# Patient Record
Sex: MALE | Race: WHITE | Employment: UNEMPLOYED | URBAN - METROPOLITAN AREA
[De-identification: names, ages, dates, MRNs, and addresses within clinical notes are randomized per-mention and may not be internally consistent; named-entity substitution may affect disease eponyms.]

---

## 2021-03-22 ENCOUNTER — HOSPITAL ENCOUNTER (EMERGENCY)
Age: 26
Discharge: HOME | End: 2021-03-22
Attending: EMERGENCY MEDICINE
Payer: COMMERCIAL

## 2021-03-22 ENCOUNTER — APPOINTMENT (OUTPATIENT)
Dept: GENERAL RADIOLOGY | Age: 26
End: 2021-03-22
Attending: EMERGENCY MEDICINE
Payer: COMMERCIAL

## 2021-03-22 VITALS
RESPIRATION RATE: 16 BRPM | DIASTOLIC BLOOD PRESSURE: 96 MMHG | SYSTOLIC BLOOD PRESSURE: 142 MMHG | HEART RATE: 91 BPM | OXYGEN SATURATION: 100 % | TEMPERATURE: 98 F | WEIGHT: 160 LBS | HEIGHT: 71 IN | BODY MASS INDEX: 22.4 KG/M2

## 2021-03-22 DIAGNOSIS — S67.10XA CRUSH INJURY TO FINGER, INITIAL ENCOUNTER: Primary | ICD-10-CM

## 2021-03-22 DIAGNOSIS — S61.211A LACERATION OF LEFT INDEX FINGER WITHOUT FOREIGN BODY WITHOUT DAMAGE TO NAIL, INITIAL ENCOUNTER: ICD-10-CM

## 2021-03-22 PROCEDURE — 99282 EMERGENCY DEPT VISIT SF MDM: CPT

## 2021-03-22 PROCEDURE — 74011250636 HC RX REV CODE- 250/636: Performed by: PHYSICIAN ASSISTANT

## 2021-03-22 PROCEDURE — 90715 TDAP VACCINE 7 YRS/> IM: CPT | Performed by: PHYSICIAN ASSISTANT

## 2021-03-22 PROCEDURE — 75810000293 HC SIMP/SUPERF WND  RPR

## 2021-03-22 PROCEDURE — 90471 IMMUNIZATION ADMIN: CPT

## 2021-03-22 PROCEDURE — 73130 X-RAY EXAM OF HAND: CPT

## 2021-03-22 RX ORDER — CEPHALEXIN 500 MG/1
500 CAPSULE ORAL 4 TIMES DAILY
Qty: 28 CAP | Refills: 0 | Status: SHIPPED | OUTPATIENT
Start: 2021-03-22 | End: 2021-03-29

## 2021-03-22 RX ADMIN — TETANUS TOXOID, REDUCED DIPHTHERIA TOXOID AND ACELLULAR PERTUSSIS VACCINE, ADSORBED 0.5 ML: 5; 2.5; 8; 8; 2.5 SUSPENSION INTRAMUSCULAR at 16:51

## 2021-03-22 NOTE — ED PROVIDER NOTES
Patient is a 82-year-old male presents with complaint of laceration and injury of the left index finger. Patient works at a [employer] and his finger got stuck in a paper compressing device. He reports throbbing pain to the distal phalanx of the left index finger. Patient does not recall last tetanus shot. Denies any numbness to the finger. The history is provided by the patient. Finger Pain   Pertinent negatives include no numbness and no back pain. Laceration   Pertinent negatives include no numbness and no weakness. No past medical history on file. No past surgical history on file. No family history on file.     Social History     Socioeconomic History    Marital status: SINGLE     Spouse name: Not on file    Number of children: Not on file    Years of education: Not on file    Highest education level: Not on file   Occupational History    Not on file   Social Needs    Financial resource strain: Not on file    Food insecurity     Worry: Not on file     Inability: Not on file    Transportation needs     Medical: Not on file     Non-medical: Not on file   Tobacco Use    Smoking status: Not on file   Substance and Sexual Activity    Alcohol use: Not on file    Drug use: Not on file    Sexual activity: Not on file   Lifestyle    Physical activity     Days per week: Not on file     Minutes per session: Not on file    Stress: Not on file   Relationships    Social connections     Talks on phone: Not on file     Gets together: Not on file     Attends Muslim service: Not on file     Active member of club or organization: Not on file     Attends meetings of clubs or organizations: Not on file     Relationship status: Not on file    Intimate partner violence     Fear of current or ex partner: Not on file     Emotionally abused: Not on file     Physically abused: Not on file     Forced sexual activity: Not on file   Other Topics Concern    Not on file   Social History Narrative  Not on file         ALLERGIES: Pcn [penicillins]    Review of Systems   Constitutional: Negative for chills and fever. HENT: Negative for sore throat. Respiratory: Negative for cough and shortness of breath. Cardiovascular: Negative for chest pain. Gastrointestinal: Negative for abdominal pain, nausea and vomiting. Musculoskeletal: Positive for arthralgias. Negative for back pain. Left index Finger pain   Skin: Positive for wound (laceration to the left index finger). Neurological: Negative for dizziness, weakness and numbness. Psychiatric/Behavioral: Negative for agitation and confusion. Vitals:    03/22/21 1501   BP: (!) 142/96   Pulse: 91   Resp: 16   Temp: 98 °F (36.7 °C)   SpO2: 100%   Weight: 72.6 kg (160 lb)   Height: 5' 11\" (1.803 m)            Physical Exam  Vitals signs and nursing note reviewed. Constitutional:       General: He is not in acute distress. Appearance: He is not ill-appearing or toxic-appearing. HENT:      Head: Normocephalic and atraumatic. Cardiovascular:      Rate and Rhythm: Normal rate. Pulses: Normal pulses. Musculoskeletal:      Comments: ttp of the left index finger, rom intact  1.5 cm Jagged edged laceration to the palmar aspect of the distal phalanx of the left index finger   Neurological:      Mental Status: He is alert and oriented to person, place, and time. Psychiatric:         Mood and Affect: Mood normal.         Behavior: Behavior normal.         Thought Content: Thought content normal.         Judgment: Judgment normal.          MDM  Number of Diagnoses or Management Options  Crush injury to finger, initial encounter  Laceration of left index finger without foreign body without damage to nail, initial encounter  Diagnosis management comments: Tdap updated. Xray negative for any acute fracture or disclocation. Wound cleaned and repaired as described in procedure note.    Pt placed in finger splint for comfort purposes. Will d/c with keflex, advised anti-inflammatories for pain/swelling. Discussed wound care, what to watch for in regards to infection as well as suture removal.   Will dc home, pt verbalizes understanding and is agreeable to plan. Wound Repair    Date/Time: 3/22/2021 4:45 PM  Performed by: PAPreparation: skin prepped with Betadine  Pre-procedure re-eval: Immediately prior to the procedure, the patient was reevaluated and found suitable for the planned procedure and any planned medications. Location details: left index finger  Wound length:2.5 cm or less  Anesthesia: digital block    Anesthesia:  Local Anesthetic: lidocaine 1% without epinephrine  Anesthetic total: 5 mL  Foreign bodies: no foreign bodies  Irrigation solution: saline  Irrigation method: jet lavage  Debridement: none  Skin closure: 5-0 nylon  Number of sutures: 4  Technique: simple  Approximation: close  Dressing: tube gauze and splint  Patient tolerance: patient tolerated the procedure well with no immediate complications  My total time at bedside, performing this procedure was 16-30 minutes.

## 2021-03-22 NOTE — ED NOTES
I have reviewed discharge instructions with the patient.  The patient verbalized understanding.    Patient left ED via Discharge Method: ambulatory to Home with self  Opportunity for questions and clarification provided.       Patient given 1 scripts.         To continue your aftercare when you leave the hospital, you may receive an automated call from our care team to check in on how you are doing.  This is a free service and part of our promise to provide the best care and service to meet your aftercare needs.” If you have questions, or wish to unsubscribe from this service please call [phone].  Thank you for Choosing our [facility] Emergency Department.

## 2021-03-22 NOTE — ED TRIAGE NOTES
Patient ambulatory to triage with mask in place. Patient reports he crushed his pointer finger in a rolling machine. Laceration not to tip of finger. No nailbed involvement.

## 2021-04-16 ENCOUNTER — HOSPITAL ENCOUNTER (EMERGENCY)
Age: 26
Discharge: HOME | End: 2021-04-16
Attending: EMERGENCY MEDICINE
Payer: COMMERCIAL

## 2021-04-16 VITALS
HEART RATE: 94 BPM | TEMPERATURE: 98.5 F | WEIGHT: 170 LBS | SYSTOLIC BLOOD PRESSURE: 142 MMHG | BODY MASS INDEX: 23.8 KG/M2 | DIASTOLIC BLOOD PRESSURE: 94 MMHG | OXYGEN SATURATION: 99 % | RESPIRATION RATE: 17 BRPM | HEIGHT: 71 IN

## 2021-04-16 DIAGNOSIS — T50.905A MEDICATION SIDE EFFECT, INITIAL ENCOUNTER: Primary | ICD-10-CM

## 2021-04-16 PROCEDURE — 99282 EMERGENCY DEPT VISIT SF MDM: CPT

## 2021-04-16 RX ORDER — MIRTAZAPINE 15 MG/1
TABLET, FILM COATED ORAL
COMMUNITY

## 2021-04-16 RX ORDER — TRAZODONE HYDROCHLORIDE 100 MG/1
100 TABLET ORAL
COMMUNITY

## 2021-04-16 RX ORDER — HYDROXYZINE PAMOATE 25 MG/1
25 CAPSULE ORAL
COMMUNITY

## 2021-04-16 RX ORDER — PRAZOSIN HYDROCHLORIDE 1 MG/1
CAPSULE ORAL
COMMUNITY

## 2021-04-16 RX ORDER — ESCITALOPRAM OXALATE 10 MG/1
10 TABLET ORAL DAILY
COMMUNITY

## 2021-04-16 NOTE — DISCHARGE INSTRUCTIONS
Stop the prazosin, continue with the other medications. If you choose to come off Lexapro, take 1/2 tab for 7 days then stop. Use the hydroxizine only as needed as directed. Drink plenty of fluids. Rest, follow up with PCP for recheck and return to the ED if worse.

## 2021-04-16 NOTE — ED TRIAGE NOTES
Pt arrives pov c/o confusion, states 28 days clean from fentanyl and currently taking lexapro. Pt a/ox4. States this morning didn't know where he was and doing things that he didn't know he was doing, reports these episodes started 5 days ago. \"I don't remember getting out of bed and my mom tells me I'm slamming drawers in the kitchen\". No SI or HI thoughts. Denies any discomfort.

## 2021-04-16 NOTE — ED NOTES
I have reviewed discharge instructions with the patient. The patient verbalized understanding. Patient left ED via Discharge Method: ambulatory to Home with self. Opportunity for questions and clarification provided. Patient given 0 scripts. To continue your aftercare when you leave the hospital, you may receive an automated call from our care team to check in on how you are doing. This is a free service and part of our promise to provide the best care and service to meet your aftercare needs.  If you have questions, or wish to unsubscribe from this service please call [phone]. Thank you for Choosing our [facility] Emergency Department.

## 2021-04-16 NOTE — ED NOTES
[facility] called as pt was transferred there earlier today. Provider with questions about plan of care, studies completed et. I reviewed with them the plan of care as per provider notes here.

## 2021-04-16 NOTE — ED PROVIDER NOTES
Patient is here with sleepwalking over the last couple of days. He states he was in rehab and they placed him on hydroxyzine, prazosin for nightmares, and Lexapro. He states that he does take trazodone and Remeron and has for years. He states he had an episode where he felt dizzy the other day like his blood pressure was low and states he normally has low blood pressure so he is not sure why he was placed on a blood pressure medication. Patient does not have a primary care physician. He is going to go into a clean/sober living facility and cannot be on any medication while he is there so he is wanting to come off of all of the medication. He is not having any chest pain, shortness of breath, headache, visual changes, dizziness, abdominal pain, trouble with urination or bowel movements, fever or other new symptoms. He did ambulate to the room without difficulty and is well-hydrated. The history is provided by the patient. No past medical history on file. No past surgical history on file. No family history on file.     Social History     Socioeconomic History    Marital status: SINGLE     Spouse name: Not on file    Number of children: Not on file    Years of education: Not on file    Highest education level: Not on file   Occupational History    Not on file   Social Needs    Financial resource strain: Not on file    Food insecurity     Worry: Not on file     Inability: Not on file    Transportation needs     Medical: Not on file     Non-medical: Not on file   Tobacco Use    Smoking status: Not on file   Substance and Sexual Activity    Alcohol use: Not on file    Drug use: Not on file    Sexual activity: Not on file   Lifestyle    Physical activity     Days per week: Not on file     Minutes per session: Not on file    Stress: Not on file   Relationships    Social connections     Talks on phone: Not on file     Gets together: Not on file     Attends Judaism service: Not on file Active member of club or organization: Not on file     Attends meetings of clubs or organizations: Not on file     Relationship status: Not on file    Intimate partner violence     Fear of current or ex partner: Not on file     Emotionally abused: Not on file     Physically abused: Not on file     Forced sexual activity: Not on file   Other Topics Concern    Not on file   Social History Narrative    Not on file         ALLERGIES: Pcn [penicillins]    Review of Systems   Constitutional: Negative. HENT: Negative. Eyes: Negative. Respiratory: Negative. Cardiovascular: Negative. Gastrointestinal: Negative. Genitourinary: Negative. Musculoskeletal: Negative. Skin: Negative. Neurological: Negative. Psychiatric/Behavioral: Negative. All other systems reviewed and are negative. Vitals:    04/16/21 0937   BP: (!) 142/94   Pulse: 94   Resp: 17   Temp: 98.5 °F (36.9 °C)   SpO2: 99%   Weight: 77.1 kg (170 lb)   Height: 5' 11\" (1.803 m)            Physical Exam  Vitals signs and nursing note reviewed. Constitutional:       Appearance: He is well-developed. HENT:      Head: Normocephalic and atraumatic. Right Ear: Tympanic membrane, ear canal and external ear normal.      Left Ear: Tympanic membrane, ear canal and external ear normal.      Nose: Nose normal.      Mouth/Throat:      Mouth: Mucous membranes are moist.   Eyes:      Conjunctiva/sclera: Conjunctivae normal.      Pupils: Pupils are equal, round, and reactive to light. Neck:      Musculoskeletal: Normal range of motion and neck supple. Cardiovascular:      Rate and Rhythm: Normal rate and regular rhythm. Pulses: Normal pulses. Heart sounds: Normal heart sounds. Pulmonary:      Effort: Pulmonary effort is normal.      Breath sounds: Normal breath sounds. Abdominal:      General: Abdomen is flat. Bowel sounds are normal.      Palpations: Abdomen is soft. Musculoskeletal: Normal range of motion. Skin:     General: Skin is warm and dry. Capillary Refill: Capillary refill takes less than 2 seconds. Neurological:      General: No focal deficit present. Mental Status: He is alert and oriented to person, place, and time. Mental status is at baseline. GCS: GCS eye subscore is 4. GCS verbal subscore is 5. GCS motor subscore is 6. Cranial Nerves: Cranial nerves are intact. No cranial nerve deficit. Sensory: Sensation is intact. No sensory deficit. Motor: Motor function is intact. No weakness. Coordination: Coordination is intact. Coordination normal.      Gait: Gait is intact. Gait normal.      Deep Tendon Reflexes: Reflexes are normal and symmetric. Reflexes normal.      Reflex Scores:       Tricep reflexes are 2+ on the right side and 2+ on the left side. Bicep reflexes are 2+ on the right side and 2+ on the left side. Brachioradialis reflexes are 2+ on the right side and 2+ on the left side. Patellar reflexes are 2+ on the right side and 2+ on the left side. Achilles reflexes are 2+ on the right side and 2+ on the left side. Psychiatric:         Mood and Affect: Mood normal.         Behavior: Behavior normal.         Thought Content: Thought content normal.         Judgment: Judgment normal.          MDM  Number of Diagnoses or Management Options  Risk of Complications, Morbidity, and/or Mortality  Presenting problems: moderate  Diagnostic procedures: moderate  Management options: moderate    Patient Progress  Patient progress: stable         Procedures      10:05 AM Spoke with pharmacy about stopping the prazosin. He do not need to titrate coming off of it, only when going up. Patient symptoms more than likely are from taking that. He has had an episode of orthostatic hypotension and some sleepwalking. He wants to come off all of his medication.   I have advised him to stop the prazosin and if he chooses to stop the Lexapro to take a half a tablet for 7 days and then discontinue. He should do one at a time so he knows what his symptoms were coming from. He was encouraged to drink plenty of water, rest and return to the ED if worsening in any way. He is stable for discharge and ambulatory out of the ER without difficulty at this time. The patient was observed in the ED. Results Reviewed:    I discussed the results of all labs, procedures, radiographs, and treatments with the patient and available family. Treatment plan is agreed upon and the patient is ready for discharge. All voiced understanding of the discharge plan and medication instructions or changes as appropriate. Questions about treatment in the ED were answered. All were encouraged to return should symptoms worsen or new problems develop.

## 2024-02-10 ENCOUNTER — APPOINTMENT (OUTPATIENT)
Dept: GENERAL RADIOLOGY | Age: 29
DRG: 091 | End: 2024-02-10

## 2024-02-10 ENCOUNTER — HOSPITAL ENCOUNTER (INPATIENT)
Age: 29
LOS: 5 days | Discharge: OTHER INSTITUTION | DRG: 091 | End: 2024-02-15
Attending: EMERGENCY MEDICINE | Admitting: FAMILY MEDICINE

## 2024-02-10 ENCOUNTER — APPOINTMENT (OUTPATIENT)
Dept: CT IMAGING | Age: 29
DRG: 091 | End: 2024-02-10

## 2024-02-10 DIAGNOSIS — T14.8XXA SKIN ABRASION: ICD-10-CM

## 2024-02-10 DIAGNOSIS — R41.82 ALTERED MENTAL STATUS, UNSPECIFIED ALTERED MENTAL STATUS TYPE: Primary | ICD-10-CM

## 2024-02-10 DIAGNOSIS — M62.82 NON-TRAUMATIC RHABDOMYOLYSIS: ICD-10-CM

## 2024-02-10 DIAGNOSIS — F19.10 DRUG ABUSE (HCC): ICD-10-CM

## 2024-02-10 DIAGNOSIS — T07.XXXA ABRASIONS OF MULTIPLE SITES: ICD-10-CM

## 2024-02-10 PROBLEM — R40.2430 GLASGOW COMA SCALE TOTAL SCORE 3-8 (HCC): Status: ACTIVE | Noted: 2024-02-10

## 2024-02-10 PROBLEM — T79.6XXA TRAUMATIC RHABDOMYOLYSIS (HCC): Status: ACTIVE | Noted: 2024-02-10

## 2024-02-10 LAB
ALBUMIN SERPL-MCNC: 3.6 G/DL (ref 3.5–5)
ALBUMIN/GLOB SERPL: 1.3 (ref 0.4–1.6)
ALP SERPL-CCNC: 72 U/L (ref 50–136)
ALT SERPL-CCNC: 41 U/L (ref 12–65)
AMPHET UR QL SCN: POSITIVE
ANION GAP SERPL CALC-SCNC: 9 MMOL/L (ref 2–11)
APPEARANCE UR: ABNORMAL
ARTERIAL PATENCY WRIST A: POSITIVE
AST SERPL-CCNC: 273 U/L (ref 15–37)
BACTERIA URNS QL MICRO: ABNORMAL /HPF
BARBITURATES UR QL SCN: NEGATIVE
BASE EXCESS BLD CALC-SCNC: 0.3 MMOL/L
BASOPHILS # BLD: 0 K/UL (ref 0–0.2)
BASOPHILS NFR BLD: 0 % (ref 0–2)
BDY SITE: ABNORMAL
BENZODIAZ UR QL: NEGATIVE
BILIRUB SERPL-MCNC: 0.9 MG/DL (ref 0.2–1.1)
BILIRUB UR QL: NEGATIVE
BUN SERPL-MCNC: 12 MG/DL (ref 6–23)
CALCIUM SERPL-MCNC: 8.6 MG/DL (ref 8.3–10.4)
CANNABINOIDS UR QL SCN: POSITIVE
CASTS URNS QL MICRO: ABNORMAL /LPF
CHLORIDE SERPL-SCNC: 102 MMOL/L (ref 103–113)
CK SERPL-CCNC: ABNORMAL U/L (ref 21–215)
CO2 SERPL-SCNC: 28 MMOL/L (ref 21–32)
COCAINE UR QL SCN: POSITIVE
COLOR UR: ABNORMAL
CREAT SERPL-MCNC: 1.5 MG/DL (ref 0.8–1.5)
CRYSTALS URNS QL MICRO: ABNORMAL /LPF
DIFFERENTIAL METHOD BLD: ABNORMAL
EOSINOPHIL # BLD: 0 K/UL (ref 0–0.8)
EOSINOPHIL NFR BLD: 0 % (ref 0.5–7.8)
ERYTHROCYTE [DISTWIDTH] IN BLOOD BY AUTOMATED COUNT: 12.9 % (ref 11.9–14.6)
ETHANOL SERPL-MCNC: <3 MG/DL (ref 0–0.08)
GAS FLOW.O2 O2 DELIVERY SYS: ABNORMAL
GLOBULIN SER CALC-MCNC: 2.8 G/DL (ref 2.8–4.5)
GLUCOSE BLD STRIP.AUTO-MCNC: 92 MG/DL (ref 65–100)
GLUCOSE SERPL-MCNC: 54 MG/DL (ref 65–100)
GLUCOSE UR STRIP.AUTO-MCNC: NEGATIVE MG/DL
HCO3 BLD-SCNC: 24.7 MMOL/L (ref 22–26)
HCT VFR BLD AUTO: 35.5 % (ref 41.1–50.3)
HGB BLD-MCNC: 12.1 G/DL (ref 13.6–17.2)
HGB UR QL STRIP: ABNORMAL
HIV 1+2 AB+HIV1 P24 AG SERPL QL IA: NONREACTIVE
HIV 1/2 RESULT COMMENT: NORMAL
IMM GRANULOCYTES # BLD AUTO: 0.1 K/UL (ref 0–0.5)
IMM GRANULOCYTES NFR BLD AUTO: 1 % (ref 0–5)
IPAP/PIP/HIGH PEEP: 20
KETONES UR QL STRIP.AUTO: 15 MG/DL
LACTATE SERPL-SCNC: 0.5 MMOL/L (ref 0.4–2)
LEUKOCYTE ESTERASE UR QL STRIP.AUTO: NEGATIVE
LYMPHOCYTES # BLD: 0.9 K/UL (ref 0.5–4.6)
LYMPHOCYTES NFR BLD: 6 % (ref 13–44)
MCH RBC QN AUTO: 29.8 PG (ref 26.1–32.9)
MCHC RBC AUTO-ENTMCNC: 34.1 G/DL (ref 31.4–35)
MCV RBC AUTO: 87.4 FL (ref 82–102)
METHADONE UR QL: NEGATIVE
MONOCYTES # BLD: 1.2 K/UL (ref 0.1–1.3)
MONOCYTES NFR BLD: 8 % (ref 4–12)
NEUTS SEG # BLD: 12.7 K/UL (ref 1.7–8.2)
NEUTS SEG NFR BLD: 85 % (ref 43–78)
NITRITE UR QL STRIP.AUTO: NEGATIVE
NRBC # BLD: 0 K/UL (ref 0–0.2)
O2/TOTAL GAS SETTING VFR VENT: 50 %
OPIATES UR QL: NEGATIVE
OTHER OBSERVATIONS: ABNORMAL
PCO2 BLD: 38.3 MMHG (ref 35–45)
PCP UR QL: NEGATIVE
PH BLD: 7.42 (ref 7.35–7.45)
PH UR STRIP: 6.5 (ref 5–9)
PLATELET # BLD AUTO: 265 K/UL (ref 150–450)
PMV BLD AUTO: 10.4 FL (ref 9.4–12.3)
PO2 BLD: 235 MMHG (ref 75–100)
POTASSIUM SERPL-SCNC: 3.9 MMOL/L (ref 3.5–5.1)
PRESSURE SUPPORT SETTING VENT: 8 CMH2O
PROT SERPL-MCNC: 6.4 G/DL (ref 6.3–8.2)
PROT UR STRIP-MCNC: 100 MG/DL
RBC # BLD AUTO: 4.06 M/UL (ref 4.23–5.6)
RBC #/AREA URNS HPF: ABNORMAL /HPF
SAO2 % BLD: 99.8 % (ref 95–98)
SERVICE CMNT-IMP: ABNORMAL
SERVICE CMNT-IMP: NORMAL
SODIUM SERPL-SCNC: 139 MMOL/L (ref 136–146)
SP GR UR REFRACTOMETRY: 1.02 (ref 1–1.02)
SPECIMEN TYPE: ABNORMAL
UROBILINOGEN UR QL STRIP.AUTO: 1 EU/DL (ref 0.2–1)
VENTILATION MODE VENT: ABNORMAL
VT SETTING VENT: 500 ML
WBC # BLD AUTO: 14.9 K/UL (ref 4.3–11.1)
WBC URNS QL MICRO: ABNORMAL /HPF
YEAST URNS QL MICRO: ABNORMAL

## 2024-02-10 PROCEDURE — 2580000003 HC RX 258: Performed by: INTERNAL MEDICINE

## 2024-02-10 PROCEDURE — 87389 HIV-1 AG W/HIV-1&-2 AB AG IA: CPT

## 2024-02-10 PROCEDURE — 71260 CT THORAX DX C+: CPT

## 2024-02-10 PROCEDURE — 6360000004 HC RX CONTRAST MEDICATION: Performed by: EMERGENCY MEDICINE

## 2024-02-10 PROCEDURE — 6360000002 HC RX W HCPCS

## 2024-02-10 PROCEDURE — 6360000002 HC RX W HCPCS: Performed by: EMERGENCY MEDICINE

## 2024-02-10 PROCEDURE — 80307 DRUG TEST PRSMV CHEM ANLYZR: CPT

## 2024-02-10 PROCEDURE — 82550 ASSAY OF CK (CPK): CPT

## 2024-02-10 PROCEDURE — 2580000003 HC RX 258: Performed by: EMERGENCY MEDICINE

## 2024-02-10 PROCEDURE — 2580000003 HC RX 258

## 2024-02-10 PROCEDURE — 71045 X-RAY EXAM CHEST 1 VIEW: CPT

## 2024-02-10 PROCEDURE — 96375 TX/PRO/DX INJ NEW DRUG ADDON: CPT

## 2024-02-10 PROCEDURE — 36600 WITHDRAWAL OF ARTERIAL BLOOD: CPT

## 2024-02-10 PROCEDURE — 2500000003 HC RX 250 WO HCPCS

## 2024-02-10 PROCEDURE — 94002 VENT MGMT INPAT INIT DAY: CPT

## 2024-02-10 PROCEDURE — 2100000000 HC CCU R&B

## 2024-02-10 PROCEDURE — 70450 CT HEAD/BRAIN W/O DYE: CPT

## 2024-02-10 PROCEDURE — 72125 CT NECK SPINE W/O DYE: CPT

## 2024-02-10 PROCEDURE — A4216 STERILE WATER/SALINE, 10 ML: HCPCS | Performed by: EMERGENCY MEDICINE

## 2024-02-10 PROCEDURE — 74018 RADEX ABDOMEN 1 VIEW: CPT

## 2024-02-10 PROCEDURE — 5A1945Z RESPIRATORY VENTILATION, 24-96 CONSECUTIVE HOURS: ICD-10-PCS | Performed by: EMERGENCY MEDICINE

## 2024-02-10 PROCEDURE — 96365 THER/PROPH/DIAG IV INF INIT: CPT

## 2024-02-10 PROCEDURE — 96374 THER/PROPH/DIAG INJ IV PUSH: CPT

## 2024-02-10 PROCEDURE — 0BH17EZ INSERTION OF ENDOTRACHEAL AIRWAY INTO TRACHEA, VIA NATURAL OR ARTIFICIAL OPENING: ICD-10-PCS | Performed by: EMERGENCY MEDICINE

## 2024-02-10 PROCEDURE — 85025 COMPLETE CBC W/AUTO DIFF WBC: CPT

## 2024-02-10 PROCEDURE — 99285 EMERGENCY DEPT VISIT HI MDM: CPT

## 2024-02-10 PROCEDURE — 81001 URINALYSIS AUTO W/SCOPE: CPT

## 2024-02-10 PROCEDURE — 82077 ASSAY SPEC XCP UR&BREATH IA: CPT

## 2024-02-10 PROCEDURE — 36415 COLL VENOUS BLD VENIPUNCTURE: CPT

## 2024-02-10 PROCEDURE — 6370000000 HC RX 637 (ALT 250 FOR IP)

## 2024-02-10 PROCEDURE — 99223 1ST HOSP IP/OBS HIGH 75: CPT

## 2024-02-10 PROCEDURE — 2500000003 HC RX 250 WO HCPCS: Performed by: EMERGENCY MEDICINE

## 2024-02-10 PROCEDURE — 80053 COMPREHEN METABOLIC PANEL: CPT

## 2024-02-10 PROCEDURE — 82803 BLOOD GASES ANY COMBINATION: CPT

## 2024-02-10 PROCEDURE — 83605 ASSAY OF LACTIC ACID: CPT

## 2024-02-10 PROCEDURE — 87040 BLOOD CULTURE FOR BACTERIA: CPT

## 2024-02-10 PROCEDURE — 82962 GLUCOSE BLOOD TEST: CPT

## 2024-02-10 RX ORDER — POLYETHYLENE GLYCOL 3350 17 G/17G
17 POWDER, FOR SOLUTION ORAL DAILY PRN
Status: DISCONTINUED | OUTPATIENT
Start: 2024-02-10 | End: 2024-02-15 | Stop reason: HOSPADM

## 2024-02-10 RX ORDER — ONDANSETRON 2 MG/ML
4 INJECTION INTRAMUSCULAR; INTRAVENOUS EVERY 6 HOURS PRN
Status: DISCONTINUED | OUTPATIENT
Start: 2024-02-10 | End: 2024-02-15 | Stop reason: HOSPADM

## 2024-02-10 RX ORDER — 0.9 % SODIUM CHLORIDE 0.9 %
2000 INTRAVENOUS SOLUTION INTRAVENOUS
Status: COMPLETED | OUTPATIENT
Start: 2024-02-10 | End: 2024-02-10

## 2024-02-10 RX ORDER — MAGNESIUM SULFATE IN WATER 40 MG/ML
2000 INJECTION, SOLUTION INTRAVENOUS PRN
Status: DISCONTINUED | OUTPATIENT
Start: 2024-02-10 | End: 2024-02-15 | Stop reason: HOSPADM

## 2024-02-10 RX ORDER — DEXTROSE MONOHYDRATE 100 MG/ML
INJECTION, SOLUTION INTRAVENOUS ONCE
Status: DISCONTINUED | OUTPATIENT
Start: 2024-02-10 | End: 2024-02-13

## 2024-02-10 RX ORDER — POTASSIUM CHLORIDE 29.8 MG/ML
20 INJECTION INTRAVENOUS PRN
Status: DISCONTINUED | OUTPATIENT
Start: 2024-02-10 | End: 2024-02-15 | Stop reason: HOSPADM

## 2024-02-10 RX ORDER — SODIUM CHLORIDE 0.9 % (FLUSH) 0.9 %
5-40 SYRINGE (ML) INJECTION PRN
Status: DISCONTINUED | OUTPATIENT
Start: 2024-02-10 | End: 2024-02-15 | Stop reason: HOSPADM

## 2024-02-10 RX ORDER — SODIUM CHLORIDE 0.9 % (FLUSH) 0.9 %
5-40 SYRINGE (ML) INJECTION EVERY 12 HOURS SCHEDULED
Status: DISCONTINUED | OUTPATIENT
Start: 2024-02-10 | End: 2024-02-15 | Stop reason: HOSPADM

## 2024-02-10 RX ORDER — ETOMIDATE 2 MG/ML
INJECTION INTRAVENOUS DAILY PRN
Status: COMPLETED | OUTPATIENT
Start: 2024-02-10 | End: 2024-02-10

## 2024-02-10 RX ORDER — ACETAMINOPHEN 325 MG/1
650 TABLET ORAL EVERY 6 HOURS PRN
Status: DISCONTINUED | OUTPATIENT
Start: 2024-02-10 | End: 2024-02-15 | Stop reason: HOSPADM

## 2024-02-10 RX ORDER — SODIUM CHLORIDE 9 MG/ML
INJECTION, SOLUTION INTRAVENOUS CONTINUOUS
Status: DISCONTINUED | OUTPATIENT
Start: 2024-02-10 | End: 2024-02-12

## 2024-02-10 RX ORDER — FENTANYL CITRATE-0.9 % NACL/PF 10 MCG/ML
25-200 PLASTIC BAG, INJECTION (ML) INTRAVENOUS CONTINUOUS
Status: DISCONTINUED | OUTPATIENT
Start: 2024-02-10 | End: 2024-02-10

## 2024-02-10 RX ORDER — FENTANYL CITRATE-0.9 % NACL/PF 10 MCG/ML
25-200 PLASTIC BAG, INJECTION (ML) INTRAVENOUS CONTINUOUS
Status: DISCONTINUED | OUTPATIENT
Start: 2024-02-10 | End: 2024-02-12

## 2024-02-10 RX ORDER — 0.9 % SODIUM CHLORIDE 0.9 %
1000 INTRAVENOUS SOLUTION INTRAVENOUS ONCE
Status: COMPLETED | OUTPATIENT
Start: 2024-02-10 | End: 2024-02-10

## 2024-02-10 RX ORDER — PROPOFOL 10 MG/ML
5-50 INJECTION, EMULSION INTRAVENOUS
Status: COMPLETED | OUTPATIENT
Start: 2024-02-10 | End: 2024-02-10

## 2024-02-10 RX ORDER — ONDANSETRON 4 MG/1
4 TABLET, ORALLY DISINTEGRATING ORAL EVERY 8 HOURS PRN
Status: DISCONTINUED | OUTPATIENT
Start: 2024-02-10 | End: 2024-02-15 | Stop reason: HOSPADM

## 2024-02-10 RX ORDER — ACETAMINOPHEN 650 MG/1
650 SUPPOSITORY RECTAL EVERY 6 HOURS PRN
Status: DISCONTINUED | OUTPATIENT
Start: 2024-02-10 | End: 2024-02-15 | Stop reason: HOSPADM

## 2024-02-10 RX ORDER — POTASSIUM CHLORIDE 7.45 MG/ML
10 INJECTION INTRAVENOUS PRN
Status: DISCONTINUED | OUTPATIENT
Start: 2024-02-10 | End: 2024-02-15 | Stop reason: HOSPADM

## 2024-02-10 RX ORDER — PROPOFOL 10 MG/ML
INJECTION, EMULSION INTRAVENOUS
Status: COMPLETED
Start: 2024-02-10 | End: 2024-02-10

## 2024-02-10 RX ORDER — SUCCINYLCHOLINE CHLORIDE 20 MG/ML
INJECTION INTRAMUSCULAR; INTRAVENOUS DAILY PRN
Status: COMPLETED | OUTPATIENT
Start: 2024-02-10 | End: 2024-02-10

## 2024-02-10 RX ORDER — SODIUM CHLORIDE 9 MG/ML
INJECTION, SOLUTION INTRAVENOUS PRN
Status: DISCONTINUED | OUTPATIENT
Start: 2024-02-10 | End: 2024-02-15 | Stop reason: HOSPADM

## 2024-02-10 RX ORDER — 0.9 % SODIUM CHLORIDE 0.9 %
1000 INTRAVENOUS SOLUTION INTRAVENOUS
Status: COMPLETED | OUTPATIENT
Start: 2024-02-10 | End: 2024-02-10

## 2024-02-10 RX ORDER — ENOXAPARIN SODIUM 100 MG/ML
40 INJECTION SUBCUTANEOUS DAILY
Status: DISCONTINUED | OUTPATIENT
Start: 2024-02-10 | End: 2024-02-10

## 2024-02-10 RX ORDER — VECURONIUM BROMIDE 1 MG/ML
10 INJECTION, POWDER, LYOPHILIZED, FOR SOLUTION INTRAVENOUS
Status: COMPLETED | OUTPATIENT
Start: 2024-02-10 | End: 2024-02-10

## 2024-02-10 RX ORDER — PROPOFOL 10 MG/ML
5-50 INJECTION, EMULSION INTRAVENOUS CONTINUOUS
Status: DISCONTINUED | OUTPATIENT
Start: 2024-02-10 | End: 2024-02-12

## 2024-02-10 RX ADMIN — PROPOFOL 50 MCG/KG/MIN: 10 INJECTION, EMULSION INTRAVENOUS at 12:34

## 2024-02-10 RX ADMIN — SODIUM CHLORIDE 2000 ML: 9 INJECTION, SOLUTION INTRAVENOUS at 04:40

## 2024-02-10 RX ADMIN — SODIUM CHLORIDE 1000 ML: 9 INJECTION, SOLUTION INTRAVENOUS at 16:40

## 2024-02-10 RX ADMIN — SODIUM CHLORIDE: 9 INJECTION, SOLUTION INTRAVENOUS at 21:28

## 2024-02-10 RX ADMIN — SODIUM CHLORIDE 1000 ML: 9 INJECTION, SOLUTION INTRAVENOUS at 02:59

## 2024-02-10 RX ADMIN — PROPOFOL 25 MCG/KG/MIN: 10 INJECTION, EMULSION INTRAVENOUS at 04:27

## 2024-02-10 RX ADMIN — SODIUM CHLORIDE: 9 INJECTION, SOLUTION INTRAVENOUS at 09:39

## 2024-02-10 RX ADMIN — Medication 100 MG: at 04:18

## 2024-02-10 RX ADMIN — FENTANYL CITRATE 75 MCG/HR: 0.05 INJECTION, SOLUTION INTRAMUSCULAR; INTRAVENOUS at 16:00

## 2024-02-10 RX ADMIN — SODIUM CHLORIDE 2000 ML: 9 INJECTION, SOLUTION INTRAVENOUS at 19:56

## 2024-02-10 RX ADMIN — PROPOFOL 50 MCG/KG/MIN: 10 INJECTION, EMULSION INTRAVENOUS at 15:58

## 2024-02-10 RX ADMIN — SODIUM CHLORIDE, PRESERVATIVE FREE 10 ML: 5 INJECTION INTRAVENOUS at 08:07

## 2024-02-10 RX ADMIN — SODIUM CHLORIDE: 9 INJECTION, SOLUTION INTRAVENOUS at 06:49

## 2024-02-10 RX ADMIN — PROPOFOL 40 MCG/KG/MIN: 10 INJECTION, EMULSION INTRAVENOUS at 21:07

## 2024-02-10 RX ADMIN — SODIUM BICARBONATE: 84 INJECTION, SOLUTION INTRAVENOUS at 06:15

## 2024-02-10 RX ADMIN — ETOMIDATE 30 MG: 2 INJECTION, SOLUTION INTRAVENOUS at 04:17

## 2024-02-10 RX ADMIN — IOPAMIDOL 100 ML: 755 INJECTION, SOLUTION INTRAVENOUS at 05:41

## 2024-02-10 RX ADMIN — VECURONIUM BROMIDE 10 MG: 1 INJECTION, POWDER, LYOPHILIZED, FOR SOLUTION INTRAVENOUS at 05:22

## 2024-02-10 RX ADMIN — FENTANYL CITRATE 50 MCG/HR: 0.05 INJECTION, SOLUTION INTRAMUSCULAR; INTRAVENOUS at 04:54

## 2024-02-10 RX ADMIN — VANCOMYCIN HYDROCHLORIDE 2000 MG: 10 INJECTION, POWDER, LYOPHILIZED, FOR SOLUTION INTRAVENOUS at 20:32

## 2024-02-10 RX ADMIN — ACETAMINOPHEN 650 MG: 650 SUPPOSITORY RECTAL at 20:09

## 2024-02-10 RX ADMIN — PROPOFOL 50 MCG/KG/MIN: 10 INJECTION, EMULSION INTRAVENOUS at 07:15

## 2024-02-10 RX ADMIN — SODIUM CHLORIDE: 9 INJECTION, SOLUTION INTRAVENOUS at 12:33

## 2024-02-10 RX ADMIN — SODIUM CHLORIDE 4 MG: 9 INJECTION INTRAMUSCULAR; INTRAVENOUS; SUBCUTANEOUS at 02:39

## 2024-02-10 RX ADMIN — PROPOFOL 25 MCG/KG/MIN: 10 INJECTION, EMULSION INTRAVENOUS at 04:24

## 2024-02-10 RX ADMIN — SODIUM CHLORIDE, PRESERVATIVE FREE 10 ML: 5 INJECTION INTRAVENOUS at 19:35

## 2024-02-10 RX ADMIN — WATER 1000 MG: 1 INJECTION INTRAMUSCULAR; INTRAVENOUS; SUBCUTANEOUS at 20:11

## 2024-02-10 NOTE — ED NOTES
Issues with propofol infusion noted. Calls made to pharmacy due to infusion continually saying . Manually programmed pump for rate start and changes. Back charting done with times and dose changes.      Clarice Li RN  02/10/24 5507

## 2024-02-10 NOTE — ED NOTES
Pt fighting and ems restraints removed and our restrains were placed on pt     Pt after restraints still needed to be held down on the stretcher by 2 security guards and 2 male nurses pt fighting staff trying to pinch and kick      Mihaela Severino RN  02/10/24 5298

## 2024-02-10 NOTE — ED NOTES
Patient in bed moving uncontrollably. Blood pressure not reading. O2 stat 96%     Suleiman Levy, RN  02/10/24 0889

## 2024-02-10 NOTE — H&P
ventilated  Cardiovascular: Sinus tachycardia no murmurs clicks gallops or rubs  Gastrointestinal:  soft with no obvious tenderness (patient is sedated); positive bowel sounds present  Musculoskeletal:  warm with no cyanosis, moderate lower extremity edema.  Denuding of the skin of the feet and great toe nail avulsion right foot  Skin:  no jaundice multiple abrasions and contusions of various stages of healing  Neurologic: Patient spontaneously moves all 4 extremities but no purposeful movement  Psychiatric: Unable to assess    CXR: Single view portable AP chest shows ET tube in good position 2.3 cm above the dariel lung fields are clear without infiltrate or effusion.  No pneumo thorax.  Mediastinal silhouette appears normal.  NG tube is in the gastric area.      EKG: Sinus tachycardia twelve-lead pending        Recent Labs     02/10/24  0249   WBC 14.9*   HGB 12.1*   HCT 35.5*        Recent Labs     02/10/24  0249      K 3.9   *   CO2 28   GLUCOSE 54*   BUN 12   CREATININE 1.50   BILITOT 0.9   *   ALT 41   ALKPHOS 72     No results for input(s): \"TROPHS\", \"NTPROBNP\", \"CRP\", \"ESR\" in the last 72 hours.  Recent Labs     02/10/24  0249   GLUCOSE 54*      ECHO: No results found for this or any previous visit from the past 3650 days.    Antibiotics:  Inpat Anti-Infectives (From admission, onward)      None          Microbiology:   Results       Procedure Component Value Units Date/Time    Culture, Blood 1 [[id number]]     Order Status: Sent Specimen: Blood     Culture, Blood 1 [[id number]]     Order Status: Sent Specimen: Blood           Ventilator Settings Ideal body weight: 73 kg (160 lb 15 oz)  Adjusted ideal body weight: 74.6 kg (164 lb 9 oz)  Mode FIO2 Rate Tidal Volume Pressure   AC/PRVC    40 % (post abg)  20 bpm     500 mL   8     Peak airway pressure:     Minute ventilation:    ABG:  Recent Labs     02/10/24  0504   BE 0.3     Assessment and Plan:  (Medical Decision Making)

## 2024-02-10 NOTE — ED NOTES
Continue to have 4 people trying to hold the pt still while trying to do procedures pt still moving all around trying to pull at lines      Mihaela Severino, RN  02/10/24 0303

## 2024-02-10 NOTE — ED NOTES
Patient is in bed fidgeting and moving uncontrollably. O2 sat 95. Unable to obtain other vitals at this time     Suleiman Levy RN  02/10/24 5861

## 2024-02-10 NOTE — ED NOTES
Pt taken to CT. Delay noted due to another patient on the table.      Clarice Li, RN  02/10/24 0566

## 2024-02-10 NOTE — ED NOTES
Pt intubated using a 7.5ett by Dr Childress. Confirmed by color change and breath sounds equal bilaterally. Chest xray to confirm placement of ETT and of OG.      Clarice Li RN  02/10/24 6169

## 2024-02-10 NOTE — ED PROVIDER NOTES
Emergency Department Provider Note       PCP: No primary care provider on file.   Age: 28 y.o.   Sex: male     DISPOSITION Admitted 02/10/2024 05:40:05 AM       ICD-10-CM    1. Altered mental status, unspecified altered mental status type  R41.82       2. Drug abuse (HCC)  F19.10       3. Non-traumatic rhabdomyolysis  M62.82       4. Skin abrasion  T14.8XXA           Medical Decision Making     Complexity of Problems Addressed:  1 or more chronic illnesses with a severe exacerbation or progression.    Data Reviewed and Analyzed:   I independently ordered and reviewed each unique test.    I reviewed external records: provider visit note from outside specialist.     [facility].  Office note reviewed.  Pt with opioid use disorder, severe, dependence.  On Buprenorphione treatment.  Pt also received 100mg Sublocade injection during visit.     The patients assessment required an independent historian: EMS.  The reason they were needed is  an altered level of consciousness.    I independently ordered and interpreted the ED EKG in the absence of a Cardiologist.    Rate: 119  EKG Interpretation: EKG Interpretation: non-specific EKG  ST Segments: Normal ST segments - NO STEMI      I interpreted the X-rays CXR with ETT above dariel.  I interpreted the CT Scan CT brain without acute hemorrhage. .    Discussion of management or test interpretation.    Pt brought to the ED via EMS for evaluation of suspected drug use/overdose.  Per EMS, patient with history of opiate use.  They state that patient's \"friends\" gave him Narcan prior to their arrival.  Upon their arrival, patient with agitation, able to be redirected.  Patient given 5 mg of Versed IM per EMS and brought to the hospital for evaluation.  On arrival, patient poorly groomed, diffuse abrasions to feet bilaterally.  Security called to bedside secondary to agitation, inability to redirect.  Patient given 4 mg of Ativan

## 2024-02-11 ENCOUNTER — APPOINTMENT (OUTPATIENT)
Dept: GENERAL RADIOLOGY | Age: 29
DRG: 091 | End: 2024-02-11

## 2024-02-11 LAB
ANION GAP SERPL CALC-SCNC: 10 MMOL/L (ref 2–11)
ANION GAP SERPL CALC-SCNC: 8 MMOL/L (ref 2–11)
ARTERIAL PATENCY WRIST A: POSITIVE
BASE DEFICIT BLD-SCNC: 5.7 MMOL/L
BASOPHILS # BLD: 0 K/UL (ref 0–0.2)
BASOPHILS NFR BLD: 0 % (ref 0–2)
BDY SITE: ABNORMAL
BUN SERPL-MCNC: 7 MG/DL (ref 6–23)
BUN SERPL-MCNC: 8 MG/DL (ref 6–23)
CALCIUM SERPL-MCNC: 7.3 MG/DL (ref 8.3–10.4)
CALCIUM SERPL-MCNC: 8 MG/DL (ref 8.3–10.4)
CHLORIDE SERPL-SCNC: 115 MMOL/L (ref 103–113)
CHLORIDE SERPL-SCNC: 116 MMOL/L (ref 103–113)
CK SERPL-CCNC: 8139 U/L (ref 21–215)
CO2 SERPL-SCNC: 18 MMOL/L (ref 21–32)
CO2 SERPL-SCNC: 21 MMOL/L (ref 21–32)
CREAT SERPL-MCNC: 0.9 MG/DL (ref 0.8–1.5)
CREAT SERPL-MCNC: 1.1 MG/DL (ref 0.8–1.5)
DIFFERENTIAL METHOD BLD: ABNORMAL
EOSINOPHIL # BLD: 0.1 K/UL (ref 0–0.8)
EOSINOPHIL NFR BLD: 1 % (ref 0.5–7.8)
ERYTHROCYTE [DISTWIDTH] IN BLOOD BY AUTOMATED COUNT: 13.7 % (ref 11.9–14.6)
GAS FLOW.O2 O2 DELIVERY SYS: ABNORMAL
GLUCOSE BLD STRIP.AUTO-MCNC: 103 MG/DL (ref 65–100)
GLUCOSE BLD STRIP.AUTO-MCNC: 55 MG/DL (ref 65–100)
GLUCOSE BLD STRIP.AUTO-MCNC: 63 MG/DL (ref 65–100)
GLUCOSE BLD STRIP.AUTO-MCNC: 76 MG/DL (ref 65–100)
GLUCOSE SERPL-MCNC: 61 MG/DL (ref 65–100)
GLUCOSE SERPL-MCNC: 65 MG/DL (ref 65–100)
HCO3 BLD-SCNC: 18.9 MMOL/L (ref 22–26)
HCT VFR BLD AUTO: 32 % (ref 41.1–50.3)
HGB BLD-MCNC: 10.4 G/DL (ref 13.6–17.2)
IMM GRANULOCYTES # BLD AUTO: 0 K/UL (ref 0–0.5)
IMM GRANULOCYTES NFR BLD AUTO: 0 % (ref 0–5)
INSPIRATION.DURATION SETTING TIME VENT: 0.9 SEC
IPAP/PIP/HIGH PEEP: 24
LYMPHOCYTES # BLD: 0.8 K/UL (ref 0.5–4.6)
LYMPHOCYTES NFR BLD: 7 % (ref 13–44)
MAGNESIUM SERPL-MCNC: 1.9 MG/DL (ref 1.8–2.4)
MCH RBC QN AUTO: 30.2 PG (ref 26.1–32.9)
MCHC RBC AUTO-ENTMCNC: 32.5 G/DL (ref 31.4–35)
MCV RBC AUTO: 93 FL (ref 82–102)
MONOCYTES # BLD: 0.7 K/UL (ref 0.1–1.3)
MONOCYTES NFR BLD: 6 % (ref 4–12)
NEUTS SEG # BLD: 10.3 K/UL (ref 1.7–8.2)
NEUTS SEG NFR BLD: 86 % (ref 43–78)
NRBC # BLD: 0 K/UL (ref 0–0.2)
O2/TOTAL GAS SETTING VFR VENT: 30 %
PAW @ MEAN EXP FLOW ON VENT: 13 CMH2O
PCO2 BLD: 32.6 MMHG (ref 35–45)
PEEP RESPIRATORY: 8 CMH2O
PH BLD: 7.37 (ref 7.35–7.45)
PLATELET # BLD AUTO: 180 K/UL (ref 150–450)
PLATELET COMMENT: ADEQUATE
PMV BLD AUTO: 10.3 FL (ref 9.4–12.3)
PO2 BLD: 82 MMHG (ref 75–100)
POTASSIUM SERPL-SCNC: 3.4 MMOL/L (ref 3.5–5.1)
POTASSIUM SERPL-SCNC: 4.4 MMOL/L (ref 3.5–5.1)
RBC # BLD AUTO: 3.44 M/UL (ref 4.23–5.6)
RBC MORPH BLD: ABNORMAL
RESPIRATORY RATE, POC: 20 (ref 5–40)
SAO2 % BLD: 95.9 % (ref 95–98)
SERVICE CMNT-IMP: ABNORMAL
SERVICE CMNT-IMP: NORMAL
SODIUM SERPL-SCNC: 144 MMOL/L (ref 136–146)
SODIUM SERPL-SCNC: 144 MMOL/L (ref 136–146)
SPECIMEN TYPE: ABNORMAL
VENTILATION MODE VENT: ABNORMAL
VT SETTING VENT: 500 ML
WBC # BLD AUTO: 11.9 K/UL (ref 4.3–11.1)
WBC MORPH BLD: ABNORMAL

## 2024-02-11 PROCEDURE — 2580000003 HC RX 258: Performed by: INTERNAL MEDICINE

## 2024-02-11 PROCEDURE — 6360000002 HC RX W HCPCS

## 2024-02-11 PROCEDURE — 82962 GLUCOSE BLOOD TEST: CPT

## 2024-02-11 PROCEDURE — 6370000000 HC RX 637 (ALT 250 FOR IP): Performed by: PHYSICIAN ASSISTANT

## 2024-02-11 PROCEDURE — 87077 CULTURE AEROBIC IDENTIFY: CPT

## 2024-02-11 PROCEDURE — 87070 CULTURE OTHR SPECIMN AEROBIC: CPT

## 2024-02-11 PROCEDURE — 6360000002 HC RX W HCPCS: Performed by: INTERNAL MEDICINE

## 2024-02-11 PROCEDURE — 2500000003 HC RX 250 WO HCPCS

## 2024-02-11 PROCEDURE — 82803 BLOOD GASES ANY COMBINATION: CPT

## 2024-02-11 PROCEDURE — 87205 SMEAR GRAM STAIN: CPT

## 2024-02-11 PROCEDURE — 99291 CRITICAL CARE FIRST HOUR: CPT | Performed by: INTERNAL MEDICINE

## 2024-02-11 PROCEDURE — 83735 ASSAY OF MAGNESIUM: CPT

## 2024-02-11 PROCEDURE — 94003 VENT MGMT INPAT SUBQ DAY: CPT

## 2024-02-11 PROCEDURE — 87185 SC STD ENZYME DETCJ PER NZM: CPT

## 2024-02-11 PROCEDURE — 2100000000 HC CCU R&B

## 2024-02-11 PROCEDURE — A4216 STERILE WATER/SALINE, 10 ML: HCPCS | Performed by: INTERNAL MEDICINE

## 2024-02-11 PROCEDURE — 2700000000 HC OXYGEN THERAPY PER DAY

## 2024-02-11 PROCEDURE — 36600 WITHDRAWAL OF ARTERIAL BLOOD: CPT

## 2024-02-11 PROCEDURE — 6360000002 HC RX W HCPCS: Performed by: EMERGENCY MEDICINE

## 2024-02-11 PROCEDURE — 36415 COLL VENOUS BLD VENIPUNCTURE: CPT

## 2024-02-11 PROCEDURE — 82550 ASSAY OF CK (CPK): CPT

## 2024-02-11 PROCEDURE — 87186 SC STD MICRODIL/AGAR DIL: CPT

## 2024-02-11 PROCEDURE — 80048 BASIC METABOLIC PNL TOTAL CA: CPT

## 2024-02-11 PROCEDURE — 2580000003 HC RX 258

## 2024-02-11 PROCEDURE — 85025 COMPLETE CBC W/AUTO DIFF WBC: CPT

## 2024-02-11 PROCEDURE — 71045 X-RAY EXAM CHEST 1 VIEW: CPT

## 2024-02-11 PROCEDURE — C9113 INJ PANTOPRAZOLE SODIUM, VIA: HCPCS | Performed by: INTERNAL MEDICINE

## 2024-02-11 RX ORDER — DEXMEDETOMIDINE HYDROCHLORIDE 4 UG/ML
.1-1.5 INJECTION, SOLUTION INTRAVENOUS CONTINUOUS
Status: DISCONTINUED | OUTPATIENT
Start: 2024-02-11 | End: 2024-02-12

## 2024-02-11 RX ORDER — DEXTROSE MONOHYDRATE 100 MG/ML
INJECTION, SOLUTION INTRAVENOUS
Status: COMPLETED
Start: 2024-02-11 | End: 2024-02-11

## 2024-02-11 RX ORDER — ENOXAPARIN SODIUM 100 MG/ML
40 INJECTION SUBCUTANEOUS DAILY
Status: DISCONTINUED | OUTPATIENT
Start: 2024-02-11 | End: 2024-02-15 | Stop reason: HOSPADM

## 2024-02-11 RX ORDER — DEXTROSE MONOHYDRATE 100 MG/ML
INJECTION, SOLUTION INTRAVENOUS CONTINUOUS PRN
Status: DISCONTINUED | OUTPATIENT
Start: 2024-02-11 | End: 2024-02-15 | Stop reason: HOSPADM

## 2024-02-11 RX ORDER — NICOTINE 21 MG/24HR
1 PATCH, TRANSDERMAL 24 HOURS TRANSDERMAL DAILY
Status: DISCONTINUED | OUTPATIENT
Start: 2024-02-11 | End: 2024-02-15 | Stop reason: HOSPADM

## 2024-02-11 RX ADMIN — DEXTROSE MONOHYDRATE 125 ML: 100 INJECTION, SOLUTION INTRAVENOUS at 08:25

## 2024-02-11 RX ADMIN — PROPOFOL 20 MCG/KG/MIN: 10 INJECTION, EMULSION INTRAVENOUS at 08:50

## 2024-02-11 RX ADMIN — SODIUM CHLORIDE: 9 INJECTION, SOLUTION INTRAVENOUS at 22:56

## 2024-02-11 RX ADMIN — SODIUM CHLORIDE, PRESERVATIVE FREE 10 ML: 5 INJECTION INTRAVENOUS at 08:01

## 2024-02-11 RX ADMIN — DEXMEDETOMIDINE 0.8 MCG/KG/HR: 100 INJECTION, SOLUTION, CONCENTRATE INTRAVENOUS at 14:37

## 2024-02-11 RX ADMIN — PROPOFOL 40 MCG/KG/MIN: 10 INJECTION, EMULSION INTRAVENOUS at 02:19

## 2024-02-11 RX ADMIN — ENOXAPARIN SODIUM 40 MG: 100 INJECTION SUBCUTANEOUS at 10:00

## 2024-02-11 RX ADMIN — PANTOPRAZOLE SODIUM 40 MG: 40 INJECTION, POWDER, FOR SOLUTION INTRAVENOUS at 10:36

## 2024-02-11 RX ADMIN — VANCOMYCIN HYDROCHLORIDE 1500 MG: 10 INJECTION, POWDER, LYOPHILIZED, FOR SOLUTION INTRAVENOUS at 10:17

## 2024-02-11 RX ADMIN — VANCOMYCIN HYDROCHLORIDE 1500 MG: 10 INJECTION, POWDER, LYOPHILIZED, FOR SOLUTION INTRAVENOUS at 21:17

## 2024-02-11 RX ADMIN — SODIUM CHLORIDE: 9 INJECTION, SOLUTION INTRAVENOUS at 12:18

## 2024-02-11 RX ADMIN — POTASSIUM CHLORIDE 10 MEQ: 7.46 INJECTION, SOLUTION INTRAVENOUS at 02:14

## 2024-02-11 RX ADMIN — SODIUM CHLORIDE: 9 INJECTION, SOLUTION INTRAVENOUS at 15:58

## 2024-02-11 RX ADMIN — POTASSIUM CHLORIDE 10 MEQ: 7.46 INJECTION, SOLUTION INTRAVENOUS at 03:03

## 2024-02-11 RX ADMIN — POTASSIUM CHLORIDE 10 MEQ: 7.46 INJECTION, SOLUTION INTRAVENOUS at 04:11

## 2024-02-11 RX ADMIN — SODIUM CHLORIDE: 9 INJECTION, SOLUTION INTRAVENOUS at 04:42

## 2024-02-11 RX ADMIN — WATER 1000 MG: 1 INJECTION INTRAMUSCULAR; INTRAVENOUS; SUBCUTANEOUS at 20:01

## 2024-02-11 RX ADMIN — DEXTROSE MONOHYDRATE 125 ML: 100 INJECTION, SOLUTION INTRAVENOUS at 18:00

## 2024-02-11 RX ADMIN — DEXMEDETOMIDINE 0.2 MCG/KG/HR: 100 INJECTION, SOLUTION, CONCENTRATE INTRAVENOUS at 07:59

## 2024-02-11 RX ADMIN — DEXMEDETOMIDINE 0.8 MCG/KG/HR: 100 INJECTION, SOLUTION, CONCENTRATE INTRAVENOUS at 21:14

## 2024-02-11 RX ADMIN — SODIUM CHLORIDE: 9 INJECTION, SOLUTION INTRAVENOUS at 08:05

## 2024-02-11 RX ADMIN — SODIUM CHLORIDE: 9 INJECTION, SOLUTION INTRAVENOUS at 01:09

## 2024-02-11 RX ADMIN — POTASSIUM CHLORIDE 10 MEQ: 7.46 INJECTION, SOLUTION INTRAVENOUS at 05:15

## 2024-02-11 RX ADMIN — SODIUM CHLORIDE, PRESERVATIVE FREE 10 ML: 5 INJECTION INTRAVENOUS at 20:03

## 2024-02-12 ENCOUNTER — APPOINTMENT (OUTPATIENT)
Dept: GENERAL RADIOLOGY | Age: 29
DRG: 091 | End: 2024-02-12

## 2024-02-12 LAB
ANION GAP SERPL CALC-SCNC: 4 MMOL/L (ref 2–11)
BUN SERPL-MCNC: 4 MG/DL (ref 6–23)
CALCIUM SERPL-MCNC: 7.8 MG/DL (ref 8.3–10.4)
CHLORIDE SERPL-SCNC: 112 MMOL/L (ref 103–113)
CK SERPL-CCNC: 3341 U/L (ref 21–215)
CO2 SERPL-SCNC: 27 MMOL/L (ref 21–32)
CREAT SERPL-MCNC: 0.7 MG/DL (ref 0.8–1.5)
ERYTHROCYTE [DISTWIDTH] IN BLOOD BY AUTOMATED COUNT: 13.9 % (ref 11.9–14.6)
GLUCOSE SERPL-MCNC: 190 MG/DL (ref 65–100)
HCT VFR BLD AUTO: 35.8 % (ref 41.1–50.3)
HGB BLD-MCNC: 11.4 G/DL (ref 13.6–17.2)
MCH RBC QN AUTO: 29.2 PG (ref 26.1–32.9)
MCHC RBC AUTO-ENTMCNC: 31.8 G/DL (ref 31.4–35)
MCV RBC AUTO: 91.8 FL (ref 82–102)
MRSA DNA SPEC QL NAA+PROBE: NOT DETECTED
NRBC # BLD: 0 K/UL (ref 0–0.2)
PLATELET # BLD AUTO: 198 K/UL (ref 150–450)
PMV BLD AUTO: 10.2 FL (ref 9.4–12.3)
POTASSIUM SERPL-SCNC: 3.7 MMOL/L (ref 3.5–5.1)
RBC # BLD AUTO: 3.9 M/UL (ref 4.23–5.6)
S AUREUS CPE NOSE QL NAA+PROBE: DETECTED
SODIUM SERPL-SCNC: 143 MMOL/L (ref 136–146)
VANCOMYCIN SERPL-MCNC: 14.4 UG/ML
WBC # BLD AUTO: 13.4 K/UL (ref 4.3–11.1)

## 2024-02-12 PROCEDURE — 99233 SBSQ HOSP IP/OBS HIGH 50: CPT | Performed by: INTERNAL MEDICINE

## 2024-02-12 PROCEDURE — 6370000000 HC RX 637 (ALT 250 FOR IP): Performed by: HOSPITALIST

## 2024-02-12 PROCEDURE — 80202 ASSAY OF VANCOMYCIN: CPT

## 2024-02-12 PROCEDURE — A4216 STERILE WATER/SALINE, 10 ML: HCPCS | Performed by: INTERNAL MEDICINE

## 2024-02-12 PROCEDURE — 87641 MR-STAPH DNA AMP PROBE: CPT

## 2024-02-12 PROCEDURE — 82550 ASSAY OF CK (CPK): CPT

## 2024-02-12 PROCEDURE — 73630 X-RAY EXAM OF FOOT: CPT

## 2024-02-12 PROCEDURE — 80048 BASIC METABOLIC PNL TOTAL CA: CPT

## 2024-02-12 PROCEDURE — 6360000002 HC RX W HCPCS

## 2024-02-12 PROCEDURE — 2580000003 HC RX 258: Performed by: INTERNAL MEDICINE

## 2024-02-12 PROCEDURE — 6370000000 HC RX 637 (ALT 250 FOR IP): Performed by: PHYSICIAN ASSISTANT

## 2024-02-12 PROCEDURE — 2580000003 HC RX 258

## 2024-02-12 PROCEDURE — 85027 COMPLETE CBC AUTOMATED: CPT

## 2024-02-12 PROCEDURE — 2580000003 HC RX 258: Performed by: STUDENT IN AN ORGANIZED HEALTH CARE EDUCATION/TRAINING PROGRAM

## 2024-02-12 PROCEDURE — 1100000000 HC RM PRIVATE

## 2024-02-12 PROCEDURE — 36415 COLL VENOUS BLD VENIPUNCTURE: CPT

## 2024-02-12 PROCEDURE — C9113 INJ PANTOPRAZOLE SODIUM, VIA: HCPCS | Performed by: INTERNAL MEDICINE

## 2024-02-12 PROCEDURE — 6360000002 HC RX W HCPCS: Performed by: INTERNAL MEDICINE

## 2024-02-12 PROCEDURE — 6370000000 HC RX 637 (ALT 250 FOR IP): Performed by: STUDENT IN AN ORGANIZED HEALTH CARE EDUCATION/TRAINING PROGRAM

## 2024-02-12 RX ORDER — TRAZODONE HYDROCHLORIDE 50 MG/1
100 TABLET ORAL NIGHTLY
Status: DISCONTINUED | OUTPATIENT
Start: 2024-02-12 | End: 2024-02-15 | Stop reason: HOSPADM

## 2024-02-12 RX ORDER — TRAMADOL HYDROCHLORIDE 50 MG/1
100 TABLET ORAL EVERY 6 HOURS PRN
Status: DISCONTINUED | OUTPATIENT
Start: 2024-02-12 | End: 2024-02-15 | Stop reason: HOSPADM

## 2024-02-12 RX ORDER — TRAMADOL HYDROCHLORIDE 50 MG/1
50 TABLET ORAL EVERY 6 HOURS PRN
Status: DISCONTINUED | OUTPATIENT
Start: 2024-02-12 | End: 2024-02-15 | Stop reason: HOSPADM

## 2024-02-12 RX ORDER — KETOROLAC TROMETHAMINE 30 MG/ML
30 INJECTION, SOLUTION INTRAMUSCULAR; INTRAVENOUS EVERY 6 HOURS PRN
Status: DISCONTINUED | OUTPATIENT
Start: 2024-02-12 | End: 2024-02-15 | Stop reason: HOSPADM

## 2024-02-12 RX ORDER — SODIUM CHLORIDE 9 MG/ML
INJECTION, SOLUTION INTRAVENOUS CONTINUOUS
Status: DISCONTINUED | OUTPATIENT
Start: 2024-02-12 | End: 2024-02-13

## 2024-02-12 RX ADMIN — SODIUM CHLORIDE: 9 INJECTION, SOLUTION INTRAVENOUS at 16:32

## 2024-02-12 RX ADMIN — SODIUM CHLORIDE: 9 INJECTION, SOLUTION INTRAVENOUS at 05:19

## 2024-02-12 RX ADMIN — MUPIROCIN: 20 OINTMENT TOPICAL at 15:52

## 2024-02-12 RX ADMIN — SODIUM CHLORIDE, PRESERVATIVE FREE 10 ML: 5 INJECTION INTRAVENOUS at 08:42

## 2024-02-12 RX ADMIN — MUPIROCIN: 20 OINTMENT TOPICAL at 20:35

## 2024-02-12 RX ADMIN — Medication: at 15:49

## 2024-02-12 RX ADMIN — VANCOMYCIN HYDROCHLORIDE 1250 MG: 10 INJECTION, POWDER, LYOPHILIZED, FOR SOLUTION INTRAVENOUS at 16:33

## 2024-02-12 RX ADMIN — SODIUM CHLORIDE, PRESERVATIVE FREE 10 ML: 5 INJECTION INTRAVENOUS at 20:38

## 2024-02-12 RX ADMIN — TRAMADOL HYDROCHLORIDE 100 MG: 50 TABLET ORAL at 16:30

## 2024-02-12 RX ADMIN — WATER 1000 MG: 1 INJECTION INTRAMUSCULAR; INTRAVENOUS; SUBCUTANEOUS at 20:36

## 2024-02-12 RX ADMIN — TRAZODONE HYDROCHLORIDE 100 MG: 50 TABLET ORAL at 20:37

## 2024-02-12 RX ADMIN — PANTOPRAZOLE SODIUM 40 MG: 40 INJECTION, POWDER, FOR SOLUTION INTRAVENOUS at 08:31

## 2024-02-12 RX ADMIN — ENOXAPARIN SODIUM 40 MG: 100 INJECTION SUBCUTANEOUS at 08:31

## 2024-02-12 RX ADMIN — VANCOMYCIN HYDROCHLORIDE 1500 MG: 10 INJECTION, POWDER, LYOPHILIZED, FOR SOLUTION INTRAVENOUS at 08:34

## 2024-02-12 RX ADMIN — SODIUM CHLORIDE: 9 INJECTION, SOLUTION INTRAVENOUS at 02:09

## 2024-02-12 NOTE — ICUWATCH
RRT Clinical Rounding Nurse Progress Report      SUBJECTIVE: Patient assessed secondary to transfer from critical care.      Vitals:    02/12/24 1100 02/12/24 1152 02/12/24 1200 02/12/24 1303   BP: 127/69  132/74 130/87   Pulse: (!) 103  (!) 102 (!) 104   Resp: 16  15 17   Temp:  98.9 °F (37.2 °C)  97.7 °F (36.5 °C)   TempSrc:  Oral  Oral   SpO2: 98%  98% 96%   Weight:       Height:            DETERIORATION INDEX SCORE: 18    ASSESSMENT:  Pt awake in bed, oriented x4. Family at bedside. Sitter at bedside. Pt on RA, lung fields clear bilaterally. Pt complaining of pain in feet, numerous scratches and cellulitis in BLE. No needs expressed at this time. Pertinent labs, images, and notes reviewed.  Case discussed with primary RN.     PLAN:  Will follow per RRT Clinical Rounding Program protocol.    Dino Beck RN  [facility]: [phone]  [facility]: [phone]
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
## 2024-02-12 NOTE — INTERDISCIPLINARY ROUNDS
Multi-D Rounds/Checklist (leapfrog):  Lines: can any be removed?: remove hyman  Urinary Catheter 02/10/24 Hyman (Active)      DVT Prophylaxis: Ordered  Vent: N/A  Nutrition Ordered/appropriate: Ordered  Can antibiotics or other drugs be stopped? No /End Date set   Inpat Anti-Infectives (From admission, onward)       Start     Ordered Stop    02/11/24 0930  vancomycin (VANCOCIN) 1500 mg in sodium chloride 0.9 % 250 mL IVPB  1,500 mg,   IntraVENous,   EVERY 12 HOURS         02/11/24 0849 02/17/24 0929    02/10/24 2000  cefTRIAXone (ROCEPHIN) 1,000 mg in sterile water 10 mL IV syringe  1,000 mg,   IntraVENous,   EVERY 24 HOURS         02/10/24 1941 02/17/24 1959                  Consults needed:  psych  A: Is pain control adequate? (has PRNs? Stop drip?) Yes  B: Sedation break and SBT? N/A  C: Is sedation choice appropriate? N/A  D: Delirium/CAM-ICU? No  E: Mobility goals/appropriateness? Yes  F: Family update and plan? Mother is surrogate decision maker and is being updated daily by primary attending and nursing staff.    Melissa Paredes, APRN - NP

## 2024-02-12 NOTE — WOUND CARE
Patient is homeless. Right foot warm to touch and erythema on plantar,  foot ascending to ankle area right great toe nail removed prior to admission, multiple scratches and abrasion, most are superficial.  The bed of the toenail has dried drainage and erythema, concern for cellulitis.  Dorsal foot with circular wound, concern this is injection site, patient agrees this l he has injected there before. Noted patient is on vancomycin, will use antifungal ointments bid to moisten the areas on toes. Recommend to consider add mupirocin for the abrasions.  Above sent by perfect serve to Dr Ramos.Minor scratches and abrasions on back and buttocks, will add aquaphor. Will loosely monitor.

## 2024-02-13 PROBLEM — G93.41 ACUTE METABOLIC ENCEPHALOPATHY: Status: ACTIVE | Noted: 2024-02-10

## 2024-02-13 PROBLEM — J18.9 PNEUMONIA OF RIGHT LOWER LOBE DUE TO INFECTIOUS ORGANISM: Status: ACTIVE | Noted: 2024-02-13

## 2024-02-13 PROBLEM — J69.0 ASPIRATION PNEUMONIA (HCC): Status: ACTIVE | Noted: 2024-02-13

## 2024-02-13 PROBLEM — A41.9 SEPSIS (HCC): Status: ACTIVE | Noted: 2024-02-13

## 2024-02-13 PROBLEM — R41.82 ACUTE ALTERATION IN MENTAL STATUS: Status: ACTIVE | Noted: 2024-02-13

## 2024-02-13 LAB
ANION GAP SERPL CALC-SCNC: 3 MMOL/L (ref 2–11)
BUN SERPL-MCNC: 2 MG/DL (ref 6–23)
CALCIUM SERPL-MCNC: 8.3 MG/DL (ref 8.3–10.4)
CHLORIDE SERPL-SCNC: 117 MMOL/L (ref 103–113)
CK SERPL-CCNC: 1409 U/L (ref 21–215)
CO2 SERPL-SCNC: 27 MMOL/L (ref 21–32)
CREAT SERPL-MCNC: 0.8 MG/DL (ref 0.8–1.5)
ERYTHROCYTE [DISTWIDTH] IN BLOOD BY AUTOMATED COUNT: 13.6 % (ref 11.9–14.6)
GLUCOSE SERPL-MCNC: 93 MG/DL (ref 65–100)
HCT VFR BLD AUTO: 34.4 % (ref 41.1–50.3)
HGB BLD-MCNC: 11.3 G/DL (ref 13.6–17.2)
MAGNESIUM SERPL-MCNC: 1.6 MG/DL (ref 1.8–2.4)
MCH RBC QN AUTO: 29.5 PG (ref 26.1–32.9)
MCHC RBC AUTO-ENTMCNC: 32.8 G/DL (ref 31.4–35)
MCV RBC AUTO: 89.8 FL (ref 82–102)
NRBC # BLD: 0 K/UL (ref 0–0.2)
PLATELET # BLD AUTO: 236 K/UL (ref 150–450)
PMV BLD AUTO: 9.9 FL (ref 9.4–12.3)
POTASSIUM SERPL-SCNC: 3.2 MMOL/L (ref 3.5–5.1)
RBC # BLD AUTO: 3.83 M/UL (ref 4.23–5.6)
SODIUM SERPL-SCNC: 147 MMOL/L (ref 136–146)
WBC # BLD AUTO: 9.9 K/UL (ref 4.3–11.1)

## 2024-02-13 PROCEDURE — 1100000000 HC RM PRIVATE

## 2024-02-13 PROCEDURE — A4216 STERILE WATER/SALINE, 10 ML: HCPCS | Performed by: INTERNAL MEDICINE

## 2024-02-13 PROCEDURE — 2580000003 HC RX 258: Performed by: FAMILY MEDICINE

## 2024-02-13 PROCEDURE — 2580000003 HC RX 258: Performed by: STUDENT IN AN ORGANIZED HEALTH CARE EDUCATION/TRAINING PROGRAM

## 2024-02-13 PROCEDURE — 82550 ASSAY OF CK (CPK): CPT

## 2024-02-13 PROCEDURE — 99232 SBSQ HOSP IP/OBS MODERATE 35: CPT | Performed by: INTERNAL MEDICINE

## 2024-02-13 PROCEDURE — 2580000003 HC RX 258

## 2024-02-13 PROCEDURE — 6370000000 HC RX 637 (ALT 250 FOR IP): Performed by: HOSPITALIST

## 2024-02-13 PROCEDURE — 6370000000 HC RX 637 (ALT 250 FOR IP): Performed by: FAMILY MEDICINE

## 2024-02-13 PROCEDURE — 6370000000 HC RX 637 (ALT 250 FOR IP)

## 2024-02-13 PROCEDURE — 97535 SELF CARE MNGMENT TRAINING: CPT

## 2024-02-13 PROCEDURE — 97116 GAIT TRAINING THERAPY: CPT

## 2024-02-13 PROCEDURE — 83735 ASSAY OF MAGNESIUM: CPT

## 2024-02-13 PROCEDURE — 2580000003 HC RX 258: Performed by: INTERNAL MEDICINE

## 2024-02-13 PROCEDURE — 85027 COMPLETE CBC AUTOMATED: CPT

## 2024-02-13 PROCEDURE — 6360000002 HC RX W HCPCS

## 2024-02-13 PROCEDURE — 97166 OT EVAL MOD COMPLEX 45 MIN: CPT

## 2024-02-13 PROCEDURE — 6370000000 HC RX 637 (ALT 250 FOR IP): Performed by: PHYSICIAN ASSISTANT

## 2024-02-13 PROCEDURE — 36415 COLL VENOUS BLD VENIPUNCTURE: CPT

## 2024-02-13 PROCEDURE — C9113 INJ PANTOPRAZOLE SODIUM, VIA: HCPCS | Performed by: INTERNAL MEDICINE

## 2024-02-13 PROCEDURE — 80048 BASIC METABOLIC PNL TOTAL CA: CPT

## 2024-02-13 PROCEDURE — 6360000002 HC RX W HCPCS: Performed by: STUDENT IN AN ORGANIZED HEALTH CARE EDUCATION/TRAINING PROGRAM

## 2024-02-13 PROCEDURE — 97161 PT EVAL LOW COMPLEX 20 MIN: CPT

## 2024-02-13 PROCEDURE — 6360000002 HC RX W HCPCS: Performed by: INTERNAL MEDICINE

## 2024-02-13 RX ORDER — OLANZAPINE 5 MG/1
10 TABLET, ORALLY DISINTEGRATING ORAL NIGHTLY
Status: DISCONTINUED | OUTPATIENT
Start: 2024-02-13 | End: 2024-02-14

## 2024-02-13 RX ORDER — DEXTROSE AND SODIUM CHLORIDE 5; .45 G/100ML; G/100ML
INJECTION, SOLUTION INTRAVENOUS CONTINUOUS
Status: DISCONTINUED | OUTPATIENT
Start: 2024-02-13 | End: 2024-02-14

## 2024-02-13 RX ORDER — PANTOPRAZOLE SODIUM 40 MG/1
40 TABLET, DELAYED RELEASE ORAL
Status: DISCONTINUED | OUTPATIENT
Start: 2024-02-14 | End: 2024-02-15 | Stop reason: HOSPADM

## 2024-02-13 RX ORDER — MAGNESIUM SULFATE IN WATER 40 MG/ML
2000 INJECTION, SOLUTION INTRAVENOUS ONCE
Status: DISCONTINUED | OUTPATIENT
Start: 2024-02-13 | End: 2024-02-13

## 2024-02-13 RX ADMIN — WATER 1000 MG: 1 INJECTION INTRAMUSCULAR; INTRAVENOUS; SUBCUTANEOUS at 20:38

## 2024-02-13 RX ADMIN — Medication: at 08:45

## 2024-02-13 RX ADMIN — VANCOMYCIN HYDROCHLORIDE 1250 MG: 10 INJECTION, POWDER, LYOPHILIZED, FOR SOLUTION INTRAVENOUS at 08:38

## 2024-02-13 RX ADMIN — ENOXAPARIN SODIUM 40 MG: 100 INJECTION SUBCUTANEOUS at 08:43

## 2024-02-13 RX ADMIN — POTASSIUM BICARBONATE 40 MEQ: 782 TABLET, EFFERVESCENT ORAL at 09:55

## 2024-02-13 RX ADMIN — KETOROLAC TROMETHAMINE 30 MG: 30 INJECTION, SOLUTION INTRAMUSCULAR; INTRAVENOUS at 20:48

## 2024-02-13 RX ADMIN — SODIUM CHLORIDE, PRESERVATIVE FREE 10 ML: 5 INJECTION INTRAVENOUS at 08:41

## 2024-02-13 RX ADMIN — TRAZODONE HYDROCHLORIDE 100 MG: 50 TABLET ORAL at 20:39

## 2024-02-13 RX ADMIN — SODIUM CHLORIDE, PRESERVATIVE FREE 10 ML: 5 INJECTION INTRAVENOUS at 20:39

## 2024-02-13 RX ADMIN — MUPIROCIN: 20 OINTMENT TOPICAL at 20:39

## 2024-02-13 RX ADMIN — SODIUM CHLORIDE: 9 INJECTION, SOLUTION INTRAVENOUS at 05:31

## 2024-02-13 RX ADMIN — OLANZAPINE 10 MG: 5 TABLET, ORALLY DISINTEGRATING ORAL at 01:17

## 2024-02-13 RX ADMIN — VANCOMYCIN HYDROCHLORIDE 1250 MG: 10 INJECTION, POWDER, LYOPHILIZED, FOR SOLUTION INTRAVENOUS at 00:20

## 2024-02-13 RX ADMIN — MAGNESIUM SULFATE HEPTAHYDRATE 2000 MG: 40 INJECTION, SOLUTION INTRAVENOUS at 10:16

## 2024-02-13 RX ADMIN — DEXTROSE AND SODIUM CHLORIDE: 5; 450 INJECTION, SOLUTION INTRAVENOUS at 18:20

## 2024-02-13 RX ADMIN — OLANZAPINE 10 MG: 5 TABLET, ORALLY DISINTEGRATING ORAL at 20:39

## 2024-02-13 RX ADMIN — DEXTROSE AND SODIUM CHLORIDE: 5; 450 INJECTION, SOLUTION INTRAVENOUS at 09:54

## 2024-02-13 RX ADMIN — POTASSIUM BICARBONATE 40 MEQ: 782 TABLET, EFFERVESCENT ORAL at 20:39

## 2024-02-13 RX ADMIN — MUPIROCIN: 20 OINTMENT TOPICAL at 08:45

## 2024-02-13 RX ADMIN — PANTOPRAZOLE SODIUM 40 MG: 40 INJECTION, POWDER, FOR SOLUTION INTRAVENOUS at 08:41

## 2024-02-13 NOTE — ICUWATCH
RRT Clinical Rounding Nurse Update    Vitals:    02/12/24 1851 02/12/24 1951 02/13/24 0351 02/13/24 0829   BP: 131/70 131/70 120/67 129/70   Pulse: (!) 106 (!) 106 80 98   Resp: 20 20 20 20   Temp: 99.1 °F (37.3 °C) 99.1 °F (37.3 °C) 99.5 °F (37.5 °C) 99.5 °F (37.5 °C)   TempSrc: Oral Oral Oral Oral   SpO2: 96%  97% 96%   Weight:   64.2 kg (141 lb 9.6 oz)    Height:            DETERIORATION INDEX SCORE: 27    ASSESSMENT:  Previous outreach assessment was reviewed. There have been no significant changes since previous assessment.    PLAN:  Will discharge from RRT Clinical Rounding Program per protocol. Please call if needed.    Radha Gallardo RN  [facility]: [phone]  [place]: [phone]

## 2024-02-13 NOTE — ICUWATCH
RRT Clinical Rounding Nurse Progress Report      SUBJECTIVE: Patient assessed secondary to transfer from critical care.      Vitals:    02/12/24 1152 02/12/24 1200 02/12/24 1303 02/12/24 1851   BP:  132/74 130/87 131/70   Pulse:  (!) 102 (!) 104 (!) 106   Resp:  15 17 20   Temp: 98.9 °F (37.2 °C)  97.7 °F (36.5 °C) 99.1 °F (37.3 °C)   TempSrc: Oral  Oral Oral   SpO2:  98% 96% 96%   Weight:       Height:            DETERIORATION INDEX SCORE: 21    ASSESSMENT:  Pt is A&O x4 and appears to be in NAD at this time. O2 sat 96% on RA. Pt denies any pain and voices no complaints.      PLAN:  Will follow per RRT Clinical Rounding Program protocol.    Tiffany Cerda RN  [facility]: [phone]  [facility]: [phone]

## 2024-02-14 LAB
ANION GAP SERPL CALC-SCNC: 0 MMOL/L (ref 2–11)
BACTERIA SPEC CULT: ABNORMAL
BUN SERPL-MCNC: 4 MG/DL (ref 6–23)
CALCIUM SERPL-MCNC: 8.8 MG/DL (ref 8.3–10.4)
CHLORIDE SERPL-SCNC: 116 MMOL/L (ref 103–113)
CK SERPL-CCNC: 594 U/L (ref 21–215)
CO2 SERPL-SCNC: 28 MMOL/L (ref 21–32)
CREAT SERPL-MCNC: 0.8 MG/DL (ref 0.8–1.5)
ERYTHROCYTE [DISTWIDTH] IN BLOOD BY AUTOMATED COUNT: 13.6 % (ref 11.9–14.6)
GLUCOSE SERPL-MCNC: 104 MG/DL (ref 65–100)
GRAM STN SPEC: ABNORMAL
HCT VFR BLD AUTO: 36.5 % (ref 41.1–50.3)
HGB BLD-MCNC: 11.7 G/DL (ref 13.6–17.2)
MAGNESIUM SERPL-MCNC: 1.9 MG/DL (ref 1.8–2.4)
MCH RBC QN AUTO: 29.5 PG (ref 26.1–32.9)
MCHC RBC AUTO-ENTMCNC: 32.1 G/DL (ref 31.4–35)
MCV RBC AUTO: 92.2 FL (ref 82–102)
NRBC # BLD: 0 K/UL (ref 0–0.2)
PLATELET # BLD AUTO: 259 K/UL (ref 150–450)
PMV BLD AUTO: 9.5 FL (ref 9.4–12.3)
POTASSIUM SERPL-SCNC: 3.5 MMOL/L (ref 3.5–5.1)
RBC # BLD AUTO: 3.96 M/UL (ref 4.23–5.6)
SERVICE CMNT-IMP: ABNORMAL
SODIUM SERPL-SCNC: 144 MMOL/L (ref 136–146)
WBC # BLD AUTO: 8 K/UL (ref 4.3–11.1)

## 2024-02-14 PROCEDURE — 85027 COMPLETE CBC AUTOMATED: CPT

## 2024-02-14 PROCEDURE — 6360000002 HC RX W HCPCS: Performed by: FAMILY MEDICINE

## 2024-02-14 PROCEDURE — 82550 ASSAY OF CK (CPK): CPT

## 2024-02-14 PROCEDURE — 2580000003 HC RX 258

## 2024-02-14 PROCEDURE — 2580000003 HC RX 258: Performed by: FAMILY MEDICINE

## 2024-02-14 PROCEDURE — 80048 BASIC METABOLIC PNL TOTAL CA: CPT

## 2024-02-14 PROCEDURE — 1100000000 HC RM PRIVATE

## 2024-02-14 PROCEDURE — 6360000002 HC RX W HCPCS: Performed by: INTERNAL MEDICINE

## 2024-02-14 PROCEDURE — 6370000000 HC RX 637 (ALT 250 FOR IP): Performed by: PHYSICIAN ASSISTANT

## 2024-02-14 PROCEDURE — 6370000000 HC RX 637 (ALT 250 FOR IP): Performed by: HOSPITALIST

## 2024-02-14 PROCEDURE — 6370000000 HC RX 637 (ALT 250 FOR IP): Performed by: FAMILY MEDICINE

## 2024-02-14 PROCEDURE — 36415 COLL VENOUS BLD VENIPUNCTURE: CPT

## 2024-02-14 PROCEDURE — 83735 ASSAY OF MAGNESIUM: CPT

## 2024-02-14 RX ORDER — OLANZAPINE 5 MG/1
5 TABLET, ORALLY DISINTEGRATING ORAL NIGHTLY
Status: DISCONTINUED | OUTPATIENT
Start: 2024-02-14 | End: 2024-02-15 | Stop reason: HOSPADM

## 2024-02-14 RX ORDER — HYDROXYZINE HYDROCHLORIDE 25 MG/1
50 TABLET, FILM COATED ORAL EVERY 6 HOURS PRN
Status: DISCONTINUED | OUTPATIENT
Start: 2024-02-14 | End: 2024-02-15 | Stop reason: HOSPADM

## 2024-02-14 RX ADMIN — POTASSIUM BICARBONATE 40 MEQ: 782 TABLET, EFFERVESCENT ORAL at 20:11

## 2024-02-14 RX ADMIN — SODIUM CHLORIDE, PRESERVATIVE FREE 10 ML: 5 INJECTION INTRAVENOUS at 20:12

## 2024-02-14 RX ADMIN — PANTOPRAZOLE SODIUM 40 MG: 40 TABLET, DELAYED RELEASE ORAL at 05:47

## 2024-02-14 RX ADMIN — WATER 1000 MG: 1 INJECTION INTRAMUSCULAR; INTRAVENOUS; SUBCUTANEOUS at 13:15

## 2024-02-14 RX ADMIN — MUPIROCIN: 20 OINTMENT TOPICAL at 20:13

## 2024-02-14 RX ADMIN — POTASSIUM BICARBONATE 40 MEQ: 782 TABLET, EFFERVESCENT ORAL at 09:39

## 2024-02-14 RX ADMIN — TRAZODONE HYDROCHLORIDE 100 MG: 50 TABLET ORAL at 20:12

## 2024-02-14 RX ADMIN — DEXTROSE AND SODIUM CHLORIDE: 5; 450 INJECTION, SOLUTION INTRAVENOUS at 02:30

## 2024-02-14 RX ADMIN — OLANZAPINE 5 MG: 5 TABLET, ORALLY DISINTEGRATING ORAL at 20:12

## 2024-02-14 RX ADMIN — ENOXAPARIN SODIUM 40 MG: 100 INJECTION SUBCUTANEOUS at 09:00

## 2024-02-14 NOTE — CONSULTS
Neurology Consult Note       History:   28-year-old male with history of polysubstance abuse.  Reportedly methamphetamine and opioids.  He is homeless, was found in the woods unconscious and twitching.  No response to Narcan.  Found with multiple abrasions.  In  rhabdomyolysis.  Neurology consulted for concern for seizure.       Exam: Pertinent positives and negatives include:  50 propofol, 75 fentanyl, recently received vecuronium  [id number]  Briskly and purposefully withdraws to noxious stimuli  Pupils are equal and reactive  He is intubated    Imaging and review of data:   CT head unremarkable      Assessment and Plan:   28-year-old male with drug-induced encephalopathy.  Toxicology positive for cocaine and amphetamines. It is possible he had a provoked seizure.  If he abstains from the insulting agents, he should not require long-term AED therapy.      He is already showing signs of conscious awareness despite heavy sedation.  I do not suspect ongoing subclinical seizures.  No need for EEG at this time. I expect him to wake up with time.  However please call back with further issues/concerns as they arise.  We will sign off at this time.       Dillon Giron, DO  Neurology      Cumulative time spent today was 35 minutes which included chart review, obtaining history (from patient, family, or other providers), review of images, examining the patient, and counseling the patient and/or family on medical condition.     
Patient does not currently meet diagnostic criteria for admission into inpatient rehabilitation due to:currently too low level to be able to participate in 3-4 hours of therapy a day.  Patient would benefit from a less intense therapy schedule over a prolonged duration of time in a setting such as a SNF.   
Psychiatric consult complete. Chart was reviewed and patient was seen. The patient denies suicidal ideation (denies plan or intent), denies homicidal ideation, denies AVH, and does not present as acutely psychotic or RTIS. Benito Jones is able to contract for safety and is future oriented in conversation. At this time, patient does not meet criteria for an inpatient psychiatric admission. Patient does not have appropriate follow-up in the community. Referrals were provided to patient . At this time, it is recommended to Discontinue 1:1 .   - Coordination with [facility] to arrange residential substance use treatment  - Defer medication recommendations at this time     Full psychiatric consult note to follow.     Thank you for consult. Please do not hesitate to contact provider if there are additional questions regarding patient.    Nereida Davidson, MARTA - NP 2/12/2024  [facility]    
Device  FiO2 : 30 %  O2 Flow Rate (L/min): 2 L/min  Blood Gas  Performed?: Yes  Aramis's Test #1: Collateral flow confirmed  Site #1: Left Brachial  Site Prepped #1: Yes  Number of Attempts #1: 1  Pressure Held #1: Yes  Complications #1: None  Specimen Status #1: Point of care  Vent Mode: AC/PRVC    Estimated body mass index is 25.97 kg/m² as calculated from the following:    Height as of this encounter: 1.778 m (5' 10\").    Weight as of this encounter: 82.1 kg (181 lb).    Intake/Output Summary (Last 24 hours) at 2/12/2024 1324  Last data filed at 2/12/2024 1107  Gross per 24 hour   Intake 9651.9 ml   Output 42424 ml   Net -1273.1 ml         Physical Exam:    General:    Awake, somnolent, multiple abrasions on skin, swollen face   Head:  Normocephalic, atraumatic  Eyes:  Sclerae appear normal.  Pupils equally round.  ENT:  Nares appear normal.  Moist oral mucosa  Neck:  No restricted ROM.  Trachea midline   CV:   RRR.  No m/r/g.  No jugular venous distension.  Lungs:   CTAB.  No wheezing, rhonchi, or rales.  Symmetric expansion.  Abdomen:   Soft, nontender, nondistended.  Extremities: B/L toes with multiple abrasions and edema, concern for injection site, pus secretions noted on right great toe  Skin:     Abrasions on face, legs, abdomen   Neuro:  CN II-XII grossly intact.  Sensation intact.   Psych:  Sad affect, somnolent      I have personally reviewed labs and tests showing:  Recent Labs:  Recent Results (from the past 24 hour(s))   POCT Glucose    Collection Time: 02/11/24  5:51 PM   Result Value Ref Range    POC Glucose 63 (L) 65 - 100 mg/dL    Performed by: Mario    POCT Glucose    Collection Time: 02/11/24  6:29 PM   Result Value Ref Range    POC Glucose 103 (H) 65 - 100 mg/dL    Performed by: Mario    CK    Collection Time: 02/12/24  3:31 AM   Result Value Ref Range    Total CK 3,341 (H) 21 - 215 U/L   CBC    Collection Time: 02/12/24  3:31 AM   Result Value Ref Range    WBC 13.4 (H) 4.3 -

## 2024-02-14 NOTE — CARE COORDINATION
Dispo update:  Per IDT rounds, sent referral to 9th floor IRC.  Also, discussed with Mr. Rodo Rios, FAVOR .  Drug/alcohol rehab program in [place] will accept Mr. Jones of room 619 tomorrow or on Friday 2/16/2024.  Will need repeat UDS on day of release.  CM updated patient and mom in room 619.

## 2024-02-14 NOTE — ICUWATCH
RRT Clinical Rounding Nurse Progress Report      SUBJECTIVE: Called to assess patient secondary to transfer from critical care.      Vitals:    02/13/24 1925 02/14/24 0419 02/14/24 0515 02/14/24 0800   BP: 127/70 132/88  125/74   Pulse: 81 93  76   Resp: 18 18  18   Temp: 99.1 °F (37.3 °C) 98.6 °F (37 °C)  98.6 °F (37 °C)   TempSrc: Oral Oral  Oral   SpO2: 98% 96%  97%   Weight:   74.1 kg (163 lb 4.8 oz)    Height:            LAB DATA:    Recent Labs     02/12/24  0331 02/13/24  0454 02/14/24  0532    147* 144   K 3.7 3.2* 3.5    117* 116*   CO2 27 27 28   BUN 4* 2* 4*   MG  --  1.6* 1.9        Recent Labs     02/12/24  0331 02/13/24  0454 02/14/24  0532   WBC 13.4* 9.9 8.0   HGB 11.4* 11.3* 11.7*   HCT 35.8* 34.4* 36.5*    236 259        DETERIORATION INDEX SCORE: 19    ASSESSMENT:  On arrival to room, I found patient to be awake resting in bed with mother at bedside. Patient is alert and oriented. Denies any major pain or SOB. Respirations even and unlabored with bilateral lung sounds clear. CK down to 594 today from 1409 yesterday. Patient expressed goal to work with therapy to attempt to shower today if possible. No needs or concerns expressed by patient or mother at bedside.     PLAN:  VS, labs, and progress notes reviewed. No concern per primary RN. Will discharge from RRT Clinical Rounding Program per protocol. Please call if needed.    Ricki Ovalle, RN  [place]: [phone]

## 2024-02-15 VITALS
DIASTOLIC BLOOD PRESSURE: 84 MMHG | OXYGEN SATURATION: 96 % | SYSTOLIC BLOOD PRESSURE: 133 MMHG | RESPIRATION RATE: 20 BRPM | WEIGHT: 163.3 LBS | BODY MASS INDEX: 23.38 KG/M2 | HEIGHT: 70 IN | HEART RATE: 86 BPM | TEMPERATURE: 98.6 F

## 2024-02-15 LAB
ANION GAP SERPL CALC-SCNC: 6 MMOL/L (ref 2–11)
BUN SERPL-MCNC: 5 MG/DL (ref 6–23)
CALCIUM SERPL-MCNC: 9.4 MG/DL (ref 8.3–10.4)
CHLORIDE SERPL-SCNC: 113 MMOL/L (ref 103–113)
CK SERPL-CCNC: 265 U/L (ref 21–215)
CO2 SERPL-SCNC: 24 MMOL/L (ref 21–32)
CREAT SERPL-MCNC: 0.9 MG/DL (ref 0.8–1.5)
ERYTHROCYTE [DISTWIDTH] IN BLOOD BY AUTOMATED COUNT: 13.7 % (ref 11.9–14.6)
GLUCOSE SERPL-MCNC: 113 MG/DL (ref 65–100)
HCT VFR BLD AUTO: 38.9 % (ref 41.1–50.3)
HGB BLD-MCNC: 12.4 G/DL (ref 13.6–17.2)
MCH RBC QN AUTO: 29.5 PG (ref 26.1–32.9)
MCHC RBC AUTO-ENTMCNC: 31.9 G/DL (ref 31.4–35)
MCV RBC AUTO: 92.4 FL (ref 82–102)
NRBC # BLD: 0 K/UL (ref 0–0.2)
PLATELET # BLD AUTO: 298 K/UL (ref 150–450)
PMV BLD AUTO: 9.5 FL (ref 9.4–12.3)
POTASSIUM SERPL-SCNC: 3.9 MMOL/L (ref 3.5–5.1)
RBC # BLD AUTO: 4.21 M/UL (ref 4.23–5.6)
SODIUM SERPL-SCNC: 143 MMOL/L (ref 136–146)
WBC # BLD AUTO: 9.7 K/UL (ref 4.3–11.1)

## 2024-02-15 PROCEDURE — 36415 COLL VENOUS BLD VENIPUNCTURE: CPT

## 2024-02-15 PROCEDURE — 6370000000 HC RX 637 (ALT 250 FOR IP): Performed by: PHYSICIAN ASSISTANT

## 2024-02-15 PROCEDURE — 2580000003 HC RX 258

## 2024-02-15 PROCEDURE — 85027 COMPLETE CBC AUTOMATED: CPT

## 2024-02-15 PROCEDURE — 80048 BASIC METABOLIC PNL TOTAL CA: CPT

## 2024-02-15 PROCEDURE — 6360000002 HC RX W HCPCS: Performed by: INTERNAL MEDICINE

## 2024-02-15 PROCEDURE — 82550 ASSAY OF CK (CPK): CPT

## 2024-02-15 PROCEDURE — 6370000000 HC RX 637 (ALT 250 FOR IP): Performed by: FAMILY MEDICINE

## 2024-02-15 RX ORDER — CEFPODOXIME PROXETIL 200 MG/1
200 TABLET, FILM COATED ORAL 2 TIMES DAILY
Qty: 10 TABLET | Refills: 0 | Status: SHIPPED | OUTPATIENT
Start: 2024-02-15 | End: 2024-02-20

## 2024-02-15 RX ORDER — TRAMADOL HYDROCHLORIDE 50 MG/1
50 TABLET ORAL EVERY 6 HOURS PRN
Qty: 21 TABLET | Refills: 0 | Status: SHIPPED | OUTPATIENT
Start: 2024-02-15 | End: 2024-02-20

## 2024-02-15 RX ORDER — PANTOPRAZOLE SODIUM 40 MG/1
40 TABLET, DELAYED RELEASE ORAL
Qty: 30 TABLET | Refills: 0 | Status: SHIPPED | OUTPATIENT
Start: 2024-02-16

## 2024-02-15 RX ORDER — ESCITALOPRAM OXALATE 10 MG/1
10 TABLET ORAL DAILY
Qty: 30 TABLET | Refills: 0 | Status: SHIPPED | OUTPATIENT
Start: 2024-02-15 | End: 2024-03-16

## 2024-02-15 RX ORDER — OLANZAPINE 5 MG/1
5 TABLET, ORALLY DISINTEGRATING ORAL NIGHTLY
Qty: 30 TABLET | Refills: 0 | Status: SHIPPED | OUTPATIENT
Start: 2024-02-15 | End: 2024-03-16

## 2024-02-15 RX ORDER — OLANZAPINE 5 MG/1
5 TABLET, ORALLY DISINTEGRATING ORAL NIGHTLY
Qty: 30 TABLET | Refills: 0 | Status: SHIPPED | OUTPATIENT
Start: 2024-02-15 | End: 2024-02-15

## 2024-02-15 RX ORDER — TRAZODONE HYDROCHLORIDE 50 MG/1
50 TABLET ORAL NIGHTLY
Qty: 30 TABLET | Refills: 0 | Status: SHIPPED | OUTPATIENT
Start: 2024-02-15 | End: 2024-03-16

## 2024-02-15 RX ADMIN — PANTOPRAZOLE SODIUM 40 MG: 40 TABLET, DELAYED RELEASE ORAL at 05:26

## 2024-02-15 RX ADMIN — MUPIROCIN: 20 OINTMENT TOPICAL at 09:52

## 2024-02-15 RX ADMIN — ENOXAPARIN SODIUM 40 MG: 100 INJECTION SUBCUTANEOUS at 09:51

## 2024-02-15 RX ADMIN — SODIUM CHLORIDE, PRESERVATIVE FREE 10 ML: 5 INJECTION INTRAVENOUS at 09:52

## 2024-02-15 RX ADMIN — Medication: at 09:53

## 2024-02-15 NOTE — CARE COORDINATION
Patient is all set to go to [place], [place] and check into [facility]. His mother and stepfather will be driving him and staying at a hotel and dropping Benito off in the morning. Both Benito and his mother are hopeful and happy with the plan we have developed.     Rodo Rios CPSS  [facility]

## 2024-02-15 NOTE — DISCHARGE SUMMARY
Hospitalist Discharge Summary   Admit Date:  2/10/2024  2:14 AM   DC Note date: 2/15/2024  Name:  Benito Jones   Age:  28 y.o.  Sex:  male  :  1995   MRN:  [id number]   Room:  [facility]  PCP:  No primary care provider on file.    Presenting Complaint: Drug Overdose     Initial Admission Diagnosis: Drug abuse (HCC) [F19.10]  Skin abrasion [T14.8XXA]  Non-traumatic rhabdomyolysis [M62.82]  Altered mental status, unspecified altered mental status type [R41.82]  Acute alteration in mental status [R41.82]     Problem List for this Hospitalization (present on admission):    Principal Problem:    Acute metabolic encephalopathy  Active Problems:    Nicole coma scale total score 3-8 (HCC)    Polysubstance abuse (HCC)    Traumatic rhabdomyolysis (HCC)    Abrasions of multiple sites    Acute alteration in mental status    Pneumonia of right lower lobe due to infectious organism    Sepsis (HCC)    Aspiration pneumonia (HCC)  Resolved Problems:    * No resolved hospital problems. *      Hospital Course:  28 y.o. male with history of polysubstance abuse with suicidal ideations who was found unconscious in the woods with twitching like activity and GCS <7.  Patient was brought into the ED by EMS and was intubated to protect his airway.  On presentation he was noted to have multiple abrasions with various stages of healing.  Labs showed UDS positive for methamphetamine, cocaine.  Labs also showed CK of 11,000 concerning for rhabdomyolysis.       Patient was admitted to the ICU for acute metabolic encephalopathy, traumatic rhabdomyolysis in setting of polysubstance abuse.  He was started on aggressive IVF for rhabdomyolysis. Neurology was consulted and evaluated patient on 2/10 for possible provoked seizure.  He was showing signs of conscious awareness despite having sedation therefore neurology did not suspect ongoing subclinical seizures.  Recommended no need for EEG at this time.  Neurology signed off 2/10.

## 2024-02-15 NOTE — PLAN OF CARE
Problem: Discharge Planning  Goal: Discharge to home or other facility with appropriate resources  Outcome: Progressing     Problem: Pain  Goal: Verbalizes/displays adequate comfort level or baseline comfort level  Outcome: Progressing     Problem: Skin/Tissue Integrity  Goal: Absence of new skin breakdown  Description: 1.  Monitor for areas of redness and/or skin breakdown  2.  Assess vascular access sites hourly  3.  Every 4-6 hours minimum:  Change oxygen saturation probe site  4.  Every 4-6 hours:  If on nasal continuous positive airway pressure, respiratory therapy assess nares and determine need for appliance change or resting period.  Outcome: Progressing     Problem: Confusion  Goal: Confusion, delirium, dementia, or psychosis is improved or at baseline  Description: INTERVENTIONS:  1. Assess for possible contributors to thought disturbance, including medications, impaired vision or hearing, underlying metabolic abnormalities, dehydration, psychiatric diagnoses, and notify attending LIP  2. [place] high risk fall precautions, as indicated  3. Provide frequent short contacts to provide reality reorientation, refocusing and direction  4. Decrease environmental stimuli, including noise as appropriate  5. Monitor and intervene to maintain adequate nutrition, hydration, elimination, sleep and activity  6. If unable to ensure safety without constant attention obtain sitter and review sitter guidelines with assigned personnel  7. Initiate Psychosocial CNS and Spiritual Care consult, as indicated  Outcome: Progressing     Problem: ABCDS Injury Assessment  Goal: Absence of physical injury  Outcome: Progressing     Problem: Safety - Adult  Goal: Free from fall injury  Outcome: Progressing     Problem: Self Harm/Suicidality  Goal: Will have no self-injury during hospital stay  Description: INTERVENTIONS:  1.  Ensure constant observer at bedside with Q15M safety checks  2.  Maintain a safe environment  3.  Secure 
  Problem: Discharge Planning  Goal: Discharge to home or other facility with appropriate resources  Outcome: Progressing     Problem: Pain  Goal: Verbalizes/displays adequate comfort level or baseline comfort level  Outcome: Progressing     Problem: Skin/Tissue Integrity  Goal: Absence of new skin breakdown  Description: 1.  Monitor for areas of redness and/or skin breakdown  2.  Assess vascular access sites hourly  3.  Every 4-6 hours minimum:  Change oxygen saturation probe site  4.  Every 4-6 hours:  If on nasal continuous positive airway pressure, respiratory therapy assess nares and determine need for appliance change or resting period.  Outcome: Progressing     Problem: Confusion  Goal: Confusion, delirium, dementia, or psychosis is improved or at baseline  Description: INTERVENTIONS:  1. Assess for possible contributors to thought disturbance, including medications, impaired vision or hearing, underlying metabolic abnormalities, dehydration, psychiatric diagnoses, and notify attending LIP  2. [place] high risk fall precautions, as indicated  3. Provide frequent short contacts to provide reality reorientation, refocusing and direction  4. Decrease environmental stimuli, including noise as appropriate  5. Monitor and intervene to maintain adequate nutrition, hydration, elimination, sleep and activity  6. If unable to ensure safety without constant attention obtain sitter and review sitter guidelines with assigned personnel  7. Initiate Psychosocial CNS and Spiritual Care consult, as indicated  Outcome: Progressing     Problem: ABCDS Injury Assessment  Goal: Absence of physical injury  Outcome: Progressing     Problem: Safety - Adult  Goal: Free from fall injury  Outcome: Progressing     Problem: Self Harm/Suicidality  Goal: Will have no self-injury during hospital stay  Description: INTERVENTIONS:  1.  Ensure constant observer at bedside with Q15M safety checks  2.  Maintain a safe environment  3.  Secure 
  Problem: Safety - Medical Restraint  Goal: Remains free of injury from restraints (Restraint for Interference with Medical Device)  Description: INTERVENTIONS:  1. Determine that other, less restrictive measures have been tried or would not be effective before applying the restraint  2. Evaluate the patient's condition at the time of restraint application  3. Inform patient/family regarding the reason for restraint  4. Q2H: Monitor safety, psychosocial status, comfort, nutrition and hydration  Outcome: Progressing     Problem: Discharge Planning  Goal: Discharge to home or other facility with appropriate resources  Outcome: Progressing  Flowsheets (Taken 2/10/2024 1917)  Discharge to home or other facility with appropriate resources:   Identify barriers to discharge with patient and caregiver   Arrange for needed discharge resources and transportation as appropriate   Identify discharge learning needs (meds, wound care, etc)   Refer to discharge planning if patient needs post-hospital services based on physician order or complex needs related to functional status, cognitive ability or social support system     Problem: Pain  Goal: Verbalizes/displays adequate comfort level or baseline comfort level  Outcome: Progressing  Flowsheets (Taken 2/10/2024 1917)  Verbalizes/displays adequate comfort level or baseline comfort level:   Encourage patient to monitor pain and request assistance   Assess pain using appropriate pain scale   Administer analgesics based on type and severity of pain and evaluate response     Problem: Skin/Tissue Integrity  Goal: Absence of new skin breakdown  Description: 1.  Monitor for areas of redness and/or skin breakdown  2.  Assess vascular access sites hourly  3.  Every 4-6 hours minimum:  Change oxygen saturation probe site  4.  Every 4-6 hours:  If on nasal continuous positive airway pressure, respiratory therapy assess nares and determine need for appliance change or resting 
Mar Da Silva, ADRIANA  Outcome: Adequate for Discharge  2/15/2024 0351 by Dwaine Couch RN  Outcome: Progressing     Problem: ABCDS Injury Assessment  Goal: Absence of physical injury  2/15/2024 1548 by Mar Da Silva RN  Outcome: Adequate for Discharge  Flowsheets (Taken 2/15/2024 0730)  Absence of Physical Injury: Implement safety measures based on patient assessment  2/15/2024 0351 by Dwaine Couch RN  Outcome: Progressing     Problem: Safety - Adult  Goal: Free from fall injury  2/15/2024 1548 by Mar Da Silva, RN  Outcome: Adequate for Discharge  Flowsheets (Taken 2/15/2024 0730)  Free From Fall Injury: Instruct family/caregiver on patient safety  2/15/2024 0351 by Dwaine Couch RN  Outcome: Progressing     Problem: Self Harm/Suicidality  Goal: Will have no self-injury during hospital stay  Description: INTERVENTIONS:  1.  Ensure constant observer at bedside with Q15M safety checks  2.  Maintain a safe environment  3.  Secure patient belongings  4.  Ensure family/visitors adhere to safety recommendations  5.  Ensure safety tray has been added to patient's diet order  6.  Every shift and PRN: Re-assess suicidal risk via Frequent Screener    2/15/2024 1548 by Mar Da Silva, RN  Outcome: Adequate for Discharge  2/15/2024 0351 by Dwaine Couch RN  Outcome: Progressing     
patient belongings  4.  Ensure family/visitors adhere to safety recommendations  5.  Ensure safety tray has been added to patient's diet order  6.  Every shift and PRN: Re-assess suicidal risk via Frequent Screener    Outcome: Progressing

## 2024-02-16 LAB
BACTERIA SPEC CULT: NORMAL
BACTERIA SPEC CULT: NORMAL
SERVICE CMNT-IMP: NORMAL
SERVICE CMNT-IMP: NORMAL

## 2024-02-16 NOTE — PROGRESS NOTES
Daily Progress Note: 2/13/2024    Benito Jones  Admission Date: 2/10/2024     Length of Stay: 3 days      Background:28 y.o. male with opioid addiction and polysubstance abuse who was seen earlier in the day staggering around several places of business.  Later that night was found in the woods (patient is homeless) unconscious and twitching.  \"Friends\" administered Narcan with no real change.  Patient was brought to ER by EMS and was intubated due to unprotected airway..  Patient has multiple abrasions contusions of various stages of healing, some appear to be more than 3 days old, trauma workup was begun by ER.  Patient had rhabdomyolysis.  No record of seizures.  Extubated  2/11, on NC and transferred to the floor.    Subjective:     Transferred to the floor. Staph in sputum. On RA.     Psychiatry consulted. Sitter and mother at bedside.     Review of Systems  Constitutional: negative for fever, chills, sweats  Cardiovascular: negative for chest pain, palpitations, syncope, edema  Gastrointestinal:  negative for dysphagia, reflux, vomiting, diarrhea, abdominal pain, or melena  Neurologic:  negative for focal weakness, numbness, headache  Current Facility-Administered Medications   Medication Dose Route Frequency    OLANZapine zydis (ZYPREXA) disintegrating tablet 10 mg  10 mg Oral Nightly    dextrose 5 % and 0.45 % sodium chloride infusion   IntraVENous Continuous    potassium bicarb-citric acid (EFFER-K) effervescent tablet 40 mEq  40 mEq Oral BID    [START ON 2/14/2024] pantoprazole (PROTONIX) tablet 40 mg  40 mg Oral QAM AC    mineral oil-hydrophilic petrolatum (AQUAPHOR) ointment   Topical Daily    mupirocin (BACTROBAN) 2 % ointment   Topical BID    ketorolac (TORADOL) injection 30 mg  30 mg IntraVENous Q6H PRN    traMADol (ULTRAM) tablet 50 mg  50 mg Oral Q6H PRN    Or    traMADol (ULTRAM) tablet 100 mg  100 mg Oral Q6H PRN    traZODone (DESYREL) tablet 100 mg  100 mg Oral Nightly    glucose 
       Hospitalist Progress Note   Admit Date:  2/10/2024  2:14 AM   Name:  Benito Jones   Age:  28 y.o.  Sex:  male  :  1995   MRN:  [id number]   Room:  [facility]/    Presenting/Chief Complaint: Drug Overdose     Reason(s) for Admission: Drug abuse (HCC) [F19.10]  Skin abrasion [T14.8XXA]  Non-traumatic rhabdomyolysis [M62.82]  Altered mental status, unspecified altered mental status type [R41.82]  Acute alteration in mental status [R41.82]     Hospital Course:   28 y.o. male with history of polysubstance abuse with suicidal ideations who was found unconscious in the woods with twitching like activity and GCS <7.  Patient was brought into the ED by EMS and was intubated to protect his airway.  On presentation he was noted to have multiple abrasions with various stages of healing.  Labs showed UDS positive for methamphetamine, cocaine.  Labs also showed CK of 11,000 concerning for rhabdomyolysis.      Patient was admitted to the ICU for acute metabolic encephalopathy, traumatic rhabdomyolysis in setting of polysubstance abuse.  He was started on aggressive IVF for rhabdomyolysis. Neurology was consulted and evaluated patient on 2/10 for possible provoked seizure.  He was showing signs of conscious awareness despite having sedation therefore neurology did not suspect ongoing subclinical seizures.  Recommended no need for EEG at this time.  Neurology signed off 2/10.    Hospitalist was consulted to assume care after pt was extubated.  He was also found with sepsis on admission secondary to bilateral lower extremity cellulitis and aspiration pneumonia.  CXR on  shows new consolidation in right lung base could represent aspiration pneumonia.CT chest/abdomen/pelvis shows prominent stool but no evidence of acute pathology in chest/abdomen/pelvis.  Patient was started on broad-spectrum antibiotics.  Blood cultures remain NGTD.  Sputum cultures grew Staph aureus.    Patient's mother expressed concerns about his 
  [facility] Critical Care Note:: 2/12/2024  Benito Jones  Admission Date: 2/10/2024     Length of Stay: 2 days    Background:  28 y.o. male with opioid addiction and polysubstance abuse who was seen earlier in the day staggering around several places of business.  Later that night was found in the woods (patient is homeless) unconscious and twitching.  \"Friends\" administered Narcan with no real change.  Patient was brought to ER by EMS and was intubated due to unprotected airway..  Patient has multiple abrasions contusions of various stages of healing, some appear to be more than 3 days old, trauma workup was begun by ER.  Patient has rhabdomyolysis.  No record of seizures.     Notable PMH:  has no past medical history on file.    24 Hour events: extubated yesterday. Weaned to 2L NC. Report that friend brought him in drugs that he took last night. Now has visitor restrictions.     Review of Systems: Unable to obtain due to patient factors.     Lines: (insertion date)        Urinary Catheter 02/10/24 Ship[place] (Active)      Drips: current dose (range)  Dose (mcg/kg/min) Propofol : 0 mcg/kg/min (weaning to extubate)  Dose (mcg/kg/hr) Dexmedetomidine: 0 mcg/kg/hr  Dose (mcg/hr) Fentanyl: 0 mcg/hr     Pertinent Exam:         Blood pressure 119/78, pulse 80, temperature 98.4 °F (36.9 °C), temperature source Oral, resp. rate 13, height 1.778 m (5' 10\"), weight 82.1 kg (181 lb), SpO2 98 %.   Intake/Output Summary (Last 24 hours) at 2/12/2024 0833  Last data filed at 2/12/2024 0631  Gross per 24 hour   Intake 9016.52 ml   Output 78875 ml   Net -1333.48 ml       Constitutional: on vent   EENMT:  Sclera clear, pupils equal, oral mucosa moist  Respiratory: coarse  Cardiovascular: RRR  Gastrointestinal:  soft with no tenderness; positive bowel sounds present  Musculoskeletal:  warm with no cyanosis,  lower extremity edema  Skin:  no jaundice or ecchymosis  Neurologic: moving all extremities   Psychiatric: sedated 
  [facility] Critical Care Note:: 2/11/2024  Benito Jones  Admission Date: 2/10/2024     Length of Stay: 1 days    Background:  28 y.o. male with opioid addiction and polysubstance abuse who was seen earlier in the day staggering around several places of business.  Later that night was found in the woods (patient is homeless) unconscious and twitching.  \"Friends\" administered Narcan with no real change.  Patient was brought to ER by EMS and was intubated due to unprotected airway..  Patient has multiple abrasions contusions of various stages of healing, some appear to be more than 3 days old, trauma workup was begun by ER.  Patient has rhabdomyolysis.  No record of seizures.     Notable PMH:  has no past medical history on file.    24 Hour events:   On vent     Review of Systems: Unable to obtain due to patient factors.     Lines: (insertion date)   ETT  (Active)     NG/OG/NJ/NE Tube Center mouth (Active)       Urinary Catheter 02/10/24 Shipley (Active)      Drips: current dose (range)  Dose (mcg/kg/min) Propofol : 20 mcg/kg/min  Dose (mcg/kg/hr) Dexmedetomidine: 0.4 mcg/kg/hr  Dose (mcg/hr) Fentanyl: 25 mcg/hr (failed SAT)     Pertinent Exam:         Blood pressure 123/70, pulse 100, temperature 98.4 °F (36.9 °C), temperature source Oral, resp. rate 20, height 1.778 m (5' 10\"), weight 82.7 kg (182 lb 5.1 oz), SpO2 95 %.   Intake/Output Summary (Last 24 hours) at 2/11/2024 0901  Last data filed at 2/11/2024 0600  Gross per 24 hour   Intake 9686.29 ml   Output 2200 ml   Net 7486.29 ml     Constitutional: on vent   EENMT:  Sclera clear, pupils equal, oral mucosa moist  Respiratory: coarse  Cardiovascular: RRR  Gastrointestinal:  soft with no tenderness; positive bowel sounds present  Musculoskeletal:  warm with no cyanosis,  lower extremity edema  Skin:  no jaundice or ecchymosis  Neurologic: moving all extremities   Psychiatric: sedated     CXR: I have reviewed and interpreted all images   2/11- new RLL 
  Patient was intubated with a number 7.5 ET Tube. Tube placement verified by auscultation, by CXR, and ETCO2 monitor. ET Tube is secured at the 24 cm sina at the teeth and on the center side. Patient was intubated by  on the 1 attempt. Breath sounds are clear. Patient is Negative for subcutaneous air and chest excursion is symmetric. Trachea is midline.  Patient is also Negative for cyanosis and is Negative for pitting edema.  Patient placed on ventilator on documented settings. All alarms are set and audible. Resuscitation bag is  at the head of the bed.      Ventilator Settings  Mode FIO2 Rate Tidal Volume Pressure PEEP I:E Ratio   AC/PRVC  50%   20  500     8    1:2.8      Peak airway pressure: 20 cmH2O    Mean Airway Pressure: 12 cmH2O  Driving Pressure: 8 cmH2O  Pplat: 16 cmH2O  Minute ventilation: 10 L/min     ABG: obtained on documented settings   02/10/24 05:04   POC pH 7.42   POC pCO2 38.3   POC PO2 235 (H)   POC HCO3 24.7   POC O2 SAT 99.8 (H)     Ventilator settings adjusted to 40% FiO2        Sarahi Frazier RCP  
  Physician Progress Note      PATIENT:               STEPHANIE CALDWELL  CSN #:                  [id number]  :                       1995  ADMIT DATE:       2/10/2024 2:14 AM  DISCH DATE:  RESPONDING  PROVIDER #:        Thu Ozuna           QUERY TEXT:    Patient admitted with traumatic rhabdomyolysis, noted to have suspected drug   use/overdose per ED provider note. If possible, please document in progress   notes and discharge summary if you are evaluating and/or treating any of the   following:    The medical record reflects the following:  Risk Factors: \" Per EMS, patient with history of opiate use.  They state that   patient's \"friends\" gave him Narcan prior to their arrival.\"  Clinical Indicators: Per ED provider \"Pt brought to the ED via EMS for   evaluation of suspected drug use/overdose.\"  Per triage RN \"Pt overdosed on meth and fentanyl and friends gave narcan.\"   RN note \"Pt found snorting blue substance. When asked what the substance   is pt states \"fentanyl.\" When asked why he was doing this he said \"I don't   want to be here anymore.\"   Psychiatry note \"The patient denies suicidal ideation (denies plan or   intent), denies homicidal ideation, denies AVH, and does not present as   acutely psychotic or RTIS. At this time, patient does not meet criteria for an   inpatient psychiatric admission.\"  Treatment: Restraints and multiple doses of benzodiazepines, Intubation &   Sedation    Thank you,  Quiana Robles RN, BSN, CDI  [email or web address]  .  Options provided:  -- Accidental overdose of, Please specify.  -- Intentional overdose of, Please specify.  -- Other - I will add my own diagnosis  -- Disagree - Not applicable / Not valid  -- Disagree - Clinically unable to determine / Unknown  -- Refer to Clinical Documentation Reviewer    PROVIDER RESPONSE TEXT:    Provider disagreed with this query.  Unable to determine    Query created by: Quiana Robles on 2024 12:37 
Bed locked in lowest position, call light within reach. VSS. IV patent, dressing clean, dry, and intact. Hourly rounding completed. All needs met at this time.    
Discussed with provider family questioning patient wanting to harm self. Psych consult placed for tomorrow. Patient extubated this AM, currently sedated on precedex resting calmly, unable to complete suicide risk assessment at this time. All VSS. Patient has not verbalized any suicidal intentions or thoughts to this RN. Will await psych evaluation.  
Initial visit made to patient and a prayer was provided. He was awake and receptive. He had a sitter with him. He was appreciative of the prayer.    Jorge Gotti MDIV, PAOLA Mercer   
Mena WRIGHT and I wasted the blue pill found in the patients room in the narcotics bin. Pharmacy notified, Dr. Harp notified.   
Opening chart as instructor for pre licensure student nurse teaching purposes.    
Pt discharged. Pt left with his parents. Completed hourly rounds. Pt's I.V removed without complications. Discharge paperwork and prescription scripts handed to pt's parents. Opportunity for questions provided. Pt denies needs at this time. All needs met at this time.  
Pt found snorting blue substance. When asked what the substance is pt states \"fentanyl.\" When asked why he was doing this he said \"I don't want to be here anymore.\"   
Pt in bed resting. All needs met at this time. Pain meds given per MAR. Will give report to oncoming nurse.   
Pt is in bed without complaints. Hourly rounds completed and all needs met. Mother has been at bedside all day and pt slept most of the day. Pt has been denying suicidal ideation. Wound care completed. Bed is low, locked, and call light is in reach. Pt encouraged to call for assistance.     
Pt is resting comfortably in bed without complaints. Good kid; tough situation.    Change in patient room and location from PM shift to AM shift was completed at shift change.  Confidential Admission/encounter remains in force.    Complex family dynamics among other factors require that only mother and step-father are allowed as visitors.    Patient tolerated Abx IV therapy well, as well as topical intervention for (R) Great toe.    Hourly rounds completed and all needs are met. Bed is locked, low and call light is within reach and patient is encouraged to call for any need(s).   
RN approached by patient's mother, Alida. Found a blue pill in the bed that the patient states is Fentanyl. ICU director and supervisor made aware, security called, and pharmacy director made aware.     Patient's room searched by  x 2.    Pill obtained and disposed of by Officer Tina.     Witnessed by Vanesa Lainez RN.  
RRT Clinical Rounding Nurse Update    Vitals:    02/12/24 1851 02/12/24 1951 02/13/24 0351 02/13/24 0829   BP: 131/70 131/70 120/67 129/70   Pulse: (!) 106 (!) 106 80 98   Resp: 20 20 20 20   Temp: 99.1 °F (37.3 °C) 99.1 °F (37.3 °C) 99.5 °F (37.5 °C) 99.5 °F (37.5 °C)   TempSrc: Oral Oral Oral Oral   SpO2: 96%  97% 96%   Weight:   64.2 kg (141 lb 9.6 oz)    Height:            DETERIORATION INDEX SCORE: 27    ASSESSMENT:  Previous outreach assessment was reviewed.  Attempted to see patient, sleeping with sitter and mom at bedside. Mom asked he not be woken up at this time. Progress notes, vital signs, and recent results reviewed.    PLAN:  Will follow per RRT Clinical Rounding Program protocol.    Radha Gallardo RN  [facility]: [phone]  [facility]: [phone]    
Respiratory mechanics measured, pt extubated, per Dr Bear, to 2LNC without issues..  
SAFETY PLAN    A suicide Safety Plan is a document that supports someone when they are having thoughts of suicide.    Warning Signs that indicate a suicidal crisis may be developing: What (situations, thoughts, feelings, body sensations, behaviors, etc.) do you experience that lets you know you are beginning to think about suicide?  1. Never thought about it  2. Same   3. same    Internal Coping Strategies:  What things can I do (relaxation techniques, hobbies, physical activities, etc.) to take my mind off my problems without contacting another person?  1. Ride Bike  2. writing  3. Reading    People and social settings that provide distraction: Who can I call or where can I go to distract me?  1. Name: call rahul Irwin  Phone: [phone]  2. Name: Ride on the trail  Phone: N/A   3. Place: N/A            4. Place: N/A    People whom I can ask for help: Who can I call when I need help - for example, friends, family, clergy, someone else?  1. Name: Parents                Phone: N/A  2. Name: April  Phone: N/A  3. Name: N/A  Phone: N/A    Professionals or Behavioral Health agencies I can contact during a crisis: Who can I call for help - for example, my doctor, my psychiatrist, my psychologist, a mental health provider, a suicide hotline?  1. Clinician Name: All of the Above   Phone: N/A      Clinician Pager or Emergency Contact #: N/A    2. Clinician Name: N/A   Phone: N/A      Clinician Pager or Emergency Contact #: N/A    3. Suicide Prevention Lifeline: 1-383-847-TALK (3784)    4. Local Behavioral Health Emergency Services -  for example, Critical access hospital [facility]         [facility], [facility] suicide hotline, [facility] Hotline: N/A       Emergency Services Address: N/A      Emergency Services Phone: N/A    Making the environment safe: How can I make my environment (house/apartment/living space) safer? For example, can I remove guns, medications, and other items?  1. Community events  2. N/A    
Therapy Note:  Therapist participated in ICU/CCU IDT rounds and believe patient is currently functioning below baseline functional mobility/ADL performance.  Patient could benefit from skilled therapy services when MD deems medically appropriate.  Thank you,  Radha Barry, PT     
VANCO DAILY NOTE  [facility]   Pharmacy Pharmacokinetic Monitoring Service - Vancomycin    Consulting Provider: Dr Tashi Harp   Indication: Sepsis - Skin/Soft tissue Infection  Target Concentration: Goal AUC/BENSON 400-600 mg*hr/L  Day of Therapy: 1  Additional Antimicrobials: Ceftriaxone 1000 mg every 24 hours     Pertinent Laboratory Values:   Wt Readings from Last 1 Encounters:   02/10/24 77.1 kg (170 lb)     Temp Readings from Last 1 Encounters:   02/10/24 99.5 °F (37.5 °C) (Oral)     Recent Labs     02/10/24  0249 02/10/24  0616   BUN 12  --    CREATININE 1.50  --    WBC 14.9*  --    LACACIDPL  --  0.5     Estimated Creatinine Clearance: 77 mL/min (based on SCr of 1.5 mg/dL).    No results found for: \"VANCOTROUGH\", \"VANCORANDOM\"    MRSA Nasal Swab: N/A. Non-respiratory infection    Assessment:  Date/Time Dose Concentration AUC         Note: Serum concentrations collected for AUC dosing may appear elevated if collected in close proximity to the dose administered, this is not necessarily an indication of toxicity    Plan:  Dosing recommendations based on Bayesian software  Started Vancomycin 2000 mg IV once (Loading Dose) then will continue therapy of Vancomycin 1250 mg IV every 18 hours - will start 2/11/2024 at  14:00   Anticipated AUC of 463 mg/L.hr and trough concentration of 11.5 at steady state  Renal labs as indicated   Vancomycin concentrations will be ordered as clinically appropriate   SFD  Pharmacy will continue to monitor patient and adjust therapy as indicated    Thank you for the consult,  Katarina Perera [facility]  
VANCO DAILY NOTE  [facility]   Pharmacy Pharmacokinetic Monitoring Service - Vancomycin    Consulting Provider: Dr Tashi Harp   Indication: Sepsis - Skin/Soft tissue Infection  Target Concentration: Goal AUC/BENSON 400-600 mg*hr/L  Day of Therapy: 3 of 7  Additional Antimicrobials: Ceftriaxone    Pertinent Laboratory Values:   Wt Readings from Last 1 Encounters:   02/12/24 82.1 kg (181 lb)     Temp Readings from Last 1 Encounters:   02/12/24 98.4 °F (36.9 °C) (Oral)     Recent Labs     02/10/24  0249 02/10/24  0616 02/11/24  0010 02/11/24  0817 02/12/24  0331   BUN 12  --  8 7 4*   CREATININE 1.50  --  0.90 1.10 0.70*   WBC 14.9*  --  11.9*  --  13.4*   LACACIDPL  --  0.5  --   --   --      Estimated Creatinine Clearance: 165 mL/min (A) (based on SCr of 0.7 mg/dL (L)).    Lab Results   Component Value Date/Time    VANCORANDOM 14.4 02/12/2024 03:31 AM       MRSA Nasal Swab: N/A. Non-respiratory infection    Assessment:  Date/Time Dose Concentration AUC   2/12 0331 1500 mg q12h 14.4 376   Note: Serum concentrations collected for AUC dosing may appear elevated if collected in close proximity to the dose administered, this is not necessarily an indication of toxicity    Plan:  Current dosing regimen is sub-therapeutic  Increase dose to 1250 mg IV every 8 hours for predicted AUC/Tr of 467/11.6  Repeat vancomycin concentrations will be ordered as clinically appropriate   Pharmacy will continue to monitor patient and adjust therapy as indicated    Thank you for the consult,  Emil Valente [facility]     
VANCO DAILY NOTE  [facility]   Pharmacy Pharmacokinetic Monitoring Service - Vancomycin    Consulting Provider: Dr Tashi Harp   Indication: Sepsis - Skin/Soft tissue Infection  Target Concentration: Goal AUC/BENSON 400-600 mg*hr/L  Day of Therapy: 2 of 7  Additional Antimicrobials: Ceftriaxone    Pertinent Laboratory Values:   Wt Readings from Last 1 Encounters:   02/11/24 82.7 kg (182 lb 5.1 oz)     Temp Readings from Last 1 Encounters:   02/11/24 98.4 °F (36.9 °C) (Oral)     Recent Labs     02/10/24  0249 02/10/24  0616 02/11/24  0010   BUN 12  --  8   CREATININE 1.50  --  0.90   WBC 14.9*  --  11.9*   LACACIDPL  --  0.5  --      Estimated Creatinine Clearance: 128 mL/min (based on SCr of 0.9 mg/dL).    No results found for: \"VANCOTROUGH\", \"VANCORANDOM\"    MRSA Nasal Swab: N/A. Non-respiratory infection    Assessment:  Date/Time Dose Concentration AUC         Note: Serum concentrations collected for AUC dosing may appear elevated if collected in close proximity to the dose administered, this is not necessarily an indication of toxicity    Plan:  Dosing recommendations based on Bayesian software  Empirically adjust vancomycin dose to 1500 mg IV q12h  Anticipated AUC of 504 and trough concentration of 11.5 at steady state  Renal labs as indicated   Vancomycin concentrations will be ordered as clinically appropriate   Pharmacy will continue to monitor patient and adjust therapy as indicated    Thank you for the consult,  Swati Yadav [facility]  
Ventilator check complete; patient has a #7.5 ET tube secured at the 26 at the lip.  Patient is  sedated.  Patient is not able to follow commands.  Breath sounds are clear and diminished.  Trachea is midline, Negative for subcutaneous air, and chest excursion is symmetric. Patient is also Negative for cyanosis and is Negative for pitting edema.  All alarms are set and audible.  Resuscitation bag is  at the head of the bed.      Ventilator Settings  Mode FIO2 Rate Tidal Volume Pressure PEEP I:E Ratio   (P) AC/PRVC  (P) 30 % 20     50    8   (P) 1:2.3      Peak airway pressure:   22  Minute ventilation: 10.1        Larry Cooley, RCP        02/11/24 0732   Breath Sounds   Breath Sounds Bilateral Diminished;Clear   Vent Information   Ventilator Day(s) 2   Vent Mode AC/PRVC   Ventilator Settings   FiO2  30 %   Insp Time (sec) 0.9 sec   Resp Rate (Set) 20 bpm   Target Vt 500   PEEP/CPAP (cmH2O) 8   Vt (Set, mL) 500 mL   Vent Patient Data (Readings)   Vt Mandatory Exp (mL) 515 mL   Vt (Measured) 515 mL   Peak Inspiratory Pressure (cmH2O) 22 cmH2O   Rate Measured 20 br/min   Minute Volume (L/min) 10.1 Liters   Peak Expiratory Flow (lpm) 41 L/min   Mean Airway Pressure (cmH2O) 12 cmH20   Plateau Pressure (cm H2O) 17 cm H2O   Driving Pressure 9   Inspiratory Time 0.9 sec   I:E Ratio 1:2.3   Flow Sensitivity 2 L/min   Static Compliance (L/cm H2O) 56   Insp Rise Time (%) 30 %   Backup Apnea On   Backup Rate 15 Breaths Per Minute   Backup Vt 500   Vent Alarm Settings   High Pressure (cmH2O) 50 cmH2O   Low Minute Volume (lpm) 2 L/min   High Minute Volume (lpm) 20 L/min   Low Exhaled Vt (ml) 200 mL   High Exhaled Vt (ml) 1200 mL   RR Low (bpm) 10   RR High (bpm) 50 br/min   Apnea (secs) 20 secs   Additional Respiratoray Assessments   Humidification Source HME   Ambu Bag With Mask At Bedside Yes   Airway Clearance   Suction ET Tube   Suction Device Inline suction catheter   Sputum Method Obtained Endotracheal   Sputum Amount 
Ventilator check complete; patient has a #7.5 ET tube secured at the 26 at the lip.  Patient is  sedated.  Patient is not able to follow commands.  Breath sounds are diminished.  Trachea is midline, Negative for subcutaneous air, and chest excursion is symmetric. Patient is also Negative for cyanosis and is Negative for pitting edema.  All alarms are set and audible.  Resuscitation bag is  at the head of the bed.      Ventilator Settings  Mode FIO2 Rate Tidal Volume Pressure PEEP I:E Ratio   AC/PRVC  30 %   20 bpm  500 mL      8 cm H2O  1:2.3            Mervin Barry RCP  
While pt visitor (Aramis) was in room with pt, pt and visitor became agitated and began searching for something in the bed and on the ground under the stretcher. Visitor (Aramis) was asked to leave the room in order to promote rest for the pt. While turning the patient to place new manuel pad under him, half of an unknown blue pill was found and before the pill could be confiscated, it was crushed and disintegrated. Pt was asked about the blue pill, pt refused to answer and promptly fell asleep. PT VSS, pt in NAD. Dr. Harp notified. Charge RN notified.   
: Yes    OBJECTIVE:     LINES / DRAINS / AIRWAY: IV    RESTRICTIONS/PRECAUTIONS:       PAIN: VITALS / O2:   Pre Treatment: significant pain in B feet          Post Treatment: same       Vitals          Oxygen            GROSS EVALUATION: INTACT IMPAIRED   (See Comments)   UE AROM [] []Limited in B shoulders    UE PROM [] []   Strength []  Generally weak in all extremities and core     Posture / Balance []  Rounded posture; fair+ to fair sitting; fair to fair- standing balance    Sensation []  Reports intact in B UE    Coordination []  Slowed      Tone []       Edema [] Present in all extremities    Activity Tolerance []  Limited by pain      Hand Dominance R [] L []      COGNITION/  PERCEPTION: INTACT IMPAIRED   (See Comments)   Orientation []  Oriented to self, place   Vision []     Hearing []     Cognition  []  Drowsy    Perception []       MOBILITY: I Mod I S SBA CGA Min Mod Max Total  NT x2 Comments:   Bed Mobility    Rolling [] [] [] [] [] [x] [] [] [] [] []    Supine to Sit [] [] [] [] [] [x] [] [] [] [] []    Scooting [] [] [] [] [] [x] [] [] [] [] []    Sit to Supine [] [] [] [] [] [] [] [] [] [] []    Transfers    Sit to Stand [] [] [] [] [] [x] [] [] [] [] []    Bed to Chair [] [] [] [] [] [] [] [] [] [x] []    Stand to Sit [] [] [] [] [] [x] [] [] [] [] []    Tub/Shower [] [] [] [] [] [] [] [] [] [x] []     Toilet [] [] [] [] [] [] [] [] [] [x] []      [] [] [] [] [] [] [] [] [] [] []    I=Independent, Mod I=Modified Independent, S=Supervision/Setup, SBA=Standby Assistance, CGA=Contact Guard Assistance, Min=Minimal Assistance, Mod=Moderate Assistance, Max=Maximal Assistance, Total=Total Assistance, NT=Not Tested    ACTIVITIES OF DAILY LIVING: I Mod I S SBA CGA Min Mod Max Total NT Comments   BASIC ADLs:              Upper Body Bathing  [] [] [] [] [] [] [] [] [] [x]     Lower Body Bathing [] [] [] [] [] [] [] [] [] [x]     Toileting [] [] [] [] [] [] [] [] [] [x]    Upper Body Dressing [] [] [] [] 
Tested    PLAN:   FREQUENCY AND DURATION: 3 times/week for duration of hospital stay or until stated goals are met, whichever comes first.    THERAPY PROGNOSIS: Good    PROBLEM LIST:   (Skilled intervention is medically necessary to address:)  Decreased ADL/Functional Activities  Decreased Activity Tolerance  Decreased AROM/PROM  Decreased Balance  Decreased Gait Ability  Decreased Strength  Decreased Transfer Abilities INTERVENTIONS PLANNED:   (Benefits and precautions of physical therapy have been discussed with the patient.)  Self Care Training  Therapeutic Activity  Therapeutic Exercise/HEP  Neuromuscular Re-education  Gait Training  Education       TREATMENT:   EVALUATION: LOW COMPLEXITY: (Untimed Charge)  At this time, patient is appropriate for Co-treatment with occupational therapy due to patient's decreased overall endurance/tolerance levels and medical status.   TREATMENT:   Gait Training (10 Minutes): Gait training for 3 feet utilizing Rolling Walker. Patient required Tactile, Verbal, and Visual cueing to improve Activity Pacing, Assistive Device Utilization, Dynamic Standing Balance, Gait Mechanics, and as well as pre-gait transfer training.     TREATMENT GRID:  N/A    AFTER TREATMENT PRECAUTIONS: Bed, Bed/Chair Locked, Call light within reach, Needs within reach, RN notified, Side rails x3, and Visitors at bedside    INTERDISCIPLINARY COLLABORATION:  RN/ PCT, PT/ PTA, and OT/ FLORES    EDUCATION: Education Given To: Patient;Family  Education Provided: Role of Therapy;Plan of Care  Education Method: Demonstration;Verbal  Barriers to Learning: None  Education Outcome: Verbalized understanding;Demonstrated understanding    TIME IN/OUT:  Time In: 1410  Time Out: 1432  Minutes: 22    Sandhya Ruiz, PT, DPT    
  Result Value Ref Range    WBC 9.9 4.3 - 11.1 K/uL    RBC 3.83 (L) 4.23 - 5.6 M/uL    Hemoglobin 11.3 (L) 13.6 - 17.2 g/dL    Hematocrit 34.4 (L) 41.1 - 50.3 %    MCV 89.8 82 - 102 FL    MCH 29.5 26.1 - 32.9 PG    MCHC 32.8 31.4 - 35.0 g/dL    RDW 13.6 11.9 - 14.6 %    Platelets 236 150 - 450 K/uL    MPV 9.9 9.4 - 12.3 FL    nRBC 0.00 0.0 - 0.2 K/uL   CK    Collection Time: 02/13/24  4:54 AM   Result Value Ref Range    Total CK 1,409 (H) 21 - 215 U/L   Magnesium    Collection Time: 02/13/24  4:54 AM   Result Value Ref Range    Magnesium 1.6 (L) 1.8 - 2.4 mg/dL   Basic Metabolic Panel w/ Reflex to MG    Collection Time: 02/14/24  5:32 AM   Result Value Ref Range    Sodium 144 136 - 146 mmol/L    Potassium 3.5 3.5 - 5.1 mmol/L    Chloride 116 (H) 103 - 113 mmol/L    CO2 28 21 - 32 mmol/L    Anion Gap 0 (L) 2 - 11 mmol/L    Glucose 104 (H) 65 - 100 mg/dL    BUN 4 (L) 6 - 23 MG/DL    Creatinine 0.80 0.8 - 1.5 MG/DL    Est, Glom Filt Rate >60 >60 ml/min/1.73m2    Calcium 8.8 8.3 - 10.4 MG/DL   CBC    Collection Time: 02/14/24  5:32 AM   Result Value Ref Range    WBC 8.0 4.3 - 11.1 K/uL    RBC 3.96 (L) 4.23 - 5.6 M/uL    Hemoglobin 11.7 (L) 13.6 - 17.2 g/dL    Hematocrit 36.5 (L) 41.1 - 50.3 %    MCV 92.2 82 - 102 FL    MCH 29.5 26.1 - 32.9 PG    MCHC 32.1 31.4 - 35.0 g/dL    RDW 13.6 11.9 - 14.6 %    Platelets 259 150 - 450 K/uL    MPV 9.5 9.4 - 12.3 FL    nRBC 0.00 0.0 - 0.2 K/uL   CK    Collection Time: 02/14/24  5:32 AM   Result Value Ref Range    Total  (H) 21 - 215 U/L   Magnesium    Collection Time: 02/14/24  5:32 AM   Result Value Ref Range    Magnesium 1.9 1.8 - 2.4 mg/dL       Current Meds:  Current Facility-Administered Medications   Medication Dose Route Frequency    OLANZapine zydis (ZYPREXA) disintegrating tablet 10 mg  10 mg Oral Nightly    dextrose 5 % and 0.45 % sodium chloride infusion   IntraVENous Continuous    potassium bicarb-citric acid (EFFER-K) effervescent tablet 40 mEq  40 mEq Oral BID

## 2024-03-04 ENCOUNTER — HOSPITAL ENCOUNTER (EMERGENCY)
Age: 29
Discharge: HOME | End: 2024-03-04
Attending: FAMILY MEDICINE
Payer: COMMERCIAL

## 2024-03-04 ENCOUNTER — APPOINTMENT (OUTPATIENT)
Dept: GENERAL RADIOLOGY | Age: 29
End: 2024-03-04
Payer: COMMERCIAL

## 2024-03-04 VITALS
OXYGEN SATURATION: 100 % | RESPIRATION RATE: 16 BRPM | HEART RATE: 92 BPM | SYSTOLIC BLOOD PRESSURE: 124 MMHG | TEMPERATURE: 98.2 F | DIASTOLIC BLOOD PRESSURE: 84 MMHG

## 2024-03-04 DIAGNOSIS — M79.642 LEFT HAND PAIN: Primary | ICD-10-CM

## 2024-03-04 PROCEDURE — 99283 EMERGENCY DEPT VISIT LOW MDM: CPT

## 2024-03-04 PROCEDURE — 73130 X-RAY EXAM OF HAND: CPT

## 2024-03-04 RX ORDER — PREDNISONE 20 MG/1
40 TABLET ORAL DAILY
Qty: 8 TABLET | Refills: 0 | Status: SHIPPED | OUTPATIENT
Start: 2024-03-04 | End: 2024-03-08

## 2024-03-04 ASSESSMENT — PAIN SCALES - GENERAL: PAINLEVEL_OUTOF10: 8

## 2024-03-04 ASSESSMENT — PAIN DESCRIPTION - ORIENTATION: ORIENTATION: LEFT

## 2024-03-04 ASSESSMENT — PAIN DESCRIPTION - DESCRIPTORS: DESCRIPTORS: PRESSURE

## 2024-03-04 ASSESSMENT — PAIN - FUNCTIONAL ASSESSMENT: PAIN_FUNCTIONAL_ASSESSMENT: 0-10

## 2024-03-04 ASSESSMENT — PAIN DESCRIPTION - LOCATION: LOCATION: HAND

## 2024-03-05 NOTE — ED PROVIDER NOTES
NOTES AND VITALS REVIEWED ---------------------------   The nursing notes within the ED encounter and vital signs as below have been reviewed.   /84   Pulse 92   Temp 98.2 °F (36.8 °C) (Temporal)   Resp 16   SpO2 100%   Oxygen Saturation Interpretation: Normal      ---------------------------------------------------PHYSICAL EXAM--------------------------------------    Constitutional/General: Alert and oriented x3, well appearing, non toxic in NAD  Head: NC/AT  Eyes: PERRL, EOMI  Mouth: Oropharynx clear, handling secretions, no trismus  Neck: Supple, full ROM, no meningeal signs  Pulmonary: Lungs clear to auscultation bilaterally, no wheezes, rales, or rhonchi. Not in respiratory distress  Cardiovascular:  Regular rate and rhythm, no murmurs, gallops, or rubs. 2+ distal pulses  Abdomen: Soft, non tender, non distended,   Extremities: Moves all extremities x 4. Warm and well perfused  Left hand:  There is some mild swelling of the webspace between the second and third MCP joints.  It is tender to palpation.  There is decreased sensation of the distal second and third fingers.  The thumb sensation is intact.  Skin: warm and dry without rash  Neurologic: GCS 15,  Psych: Normal Affect      ------------------------------ ED COURSE/MEDICAL DECISION MAKING----------------------  Medications - No data to display      Medical Decision Making:    Left hand x-ray: Negative for acute findings.    Based on the patient's history of injecting narcotics into the webspace between second third fingers, I informed him that the ED allergy most likely is that he has some nerve damage from injecting in that area and possibly is because some vein damage as well resulted in this chronic swelling and paresthesias of the fingers.  Should his symptoms worsen or he need further evaluation I recommend he follow-up with orthopedic specialty.  He was agreeable to this plan.    Counseling:   The emergency provider has spoken with the

## 2024-03-11 ENCOUNTER — APPOINTMENT (OUTPATIENT)
Dept: ULTRASOUND IMAGING | Age: 29
End: 2024-03-11
Payer: COMMERCIAL

## 2024-03-11 ENCOUNTER — HOSPITAL ENCOUNTER (EMERGENCY)
Age: 29
Discharge: HOME | End: 2024-03-11
Attending: EMERGENCY MEDICINE
Payer: COMMERCIAL

## 2024-03-11 VITALS
DIASTOLIC BLOOD PRESSURE: 76 MMHG | HEART RATE: 99 BPM | RESPIRATION RATE: 20 BRPM | SYSTOLIC BLOOD PRESSURE: 129 MMHG | OXYGEN SATURATION: 100 % | TEMPERATURE: 97.7 F

## 2024-03-11 DIAGNOSIS — M79.18 MYALGIA, MULTIPLE SITES: Primary | ICD-10-CM

## 2024-03-11 LAB
ALBUMIN SERPL-MCNC: 4.2 G/DL (ref 3.5–5.2)
ALP SERPL-CCNC: 74 U/L (ref 40–129)
ALT SERPL-CCNC: 18 U/L (ref 0–40)
ANION GAP SERPL CALCULATED.3IONS-SCNC: 14 MMOL/L (ref 7–16)
AST SERPL-CCNC: 15 U/L (ref 0–39)
BASOPHILS # BLD: 0.12 K/UL (ref 0–0.2)
BASOPHILS NFR BLD: 1 % (ref 0–2)
BILIRUB SERPL-MCNC: 0.3 MG/DL (ref 0–1.2)
BILIRUB UR QL STRIP: NEGATIVE
BUN SERPL-MCNC: 23 MG/DL (ref 6–20)
CALCIUM SERPL-MCNC: 9.4 MG/DL (ref 8.6–10.2)
CHLORIDE SERPL-SCNC: 96 MMOL/L (ref 98–107)
CK SERPL-CCNC: 47 U/L (ref 20–200)
CLARITY UR: CLEAR
CO2 SERPL-SCNC: 29 MMOL/L (ref 22–29)
COLOR UR: YELLOW
CREAT SERPL-MCNC: 1.2 MG/DL (ref 0.7–1.2)
EOSINOPHIL # BLD: 0.18 K/UL (ref 0.05–0.5)
EOSINOPHILS RELATIVE PERCENT: 2 % (ref 0–6)
ERYTHROCYTE [DISTWIDTH] IN BLOOD BY AUTOMATED COUNT: 14.4 % (ref 11.5–15)
GFR SERPL CREATININE-BSD FRML MDRD: >60 ML/MIN/1.73M2
GLUCOSE SERPL-MCNC: 99 MG/DL (ref 74–99)
GLUCOSE UR STRIP-MCNC: NEGATIVE MG/DL
HCT VFR BLD AUTO: 44.6 % (ref 37–54)
HGB BLD-MCNC: 14.1 G/DL (ref 12.5–16.5)
HGB UR QL STRIP.AUTO: NEGATIVE
IMM GRANULOCYTES # BLD AUTO: 0.23 K/UL (ref 0–0.58)
IMM GRANULOCYTES NFR BLD: 2 % (ref 0–5)
KETONES UR STRIP-MCNC: NEGATIVE MG/DL
LEUKOCYTE ESTERASE UR QL STRIP: NEGATIVE
LYMPHOCYTES NFR BLD: 2.92 K/UL (ref 1.5–4)
LYMPHOCYTES RELATIVE PERCENT: 28 % (ref 20–42)
MAGNESIUM SERPL-MCNC: 2 MG/DL (ref 1.6–2.6)
MCH RBC QN AUTO: 29.6 PG (ref 26–35)
MCHC RBC AUTO-ENTMCNC: 31.6 G/DL (ref 32–34.5)
MCV RBC AUTO: 93.5 FL (ref 80–99.9)
MONOCYTES NFR BLD: 0.79 K/UL (ref 0.1–0.95)
MONOCYTES NFR BLD: 8 % (ref 2–12)
NEUTROPHILS NFR BLD: 60 % (ref 43–80)
NEUTS SEG NFR BLD: 6.34 K/UL (ref 1.8–7.3)
NITRITE UR QL STRIP: NEGATIVE
PH UR STRIP: 6.5 [PH] (ref 5–9)
PLATELET # BLD AUTO: 220 K/UL (ref 130–450)
PMV BLD AUTO: 10.1 FL (ref 7–12)
POTASSIUM SERPL-SCNC: 3.7 MMOL/L (ref 3.5–5)
PROT SERPL-MCNC: 7.1 G/DL (ref 6.4–8.3)
PROT UR STRIP-MCNC: NEGATIVE MG/DL
RBC # BLD AUTO: 4.77 M/UL (ref 3.8–5.8)
RBC #/AREA URNS HPF: NORMAL /HPF
SODIUM SERPL-SCNC: 139 MMOL/L (ref 132–146)
SP GR UR STRIP: 1.02 (ref 1–1.03)
UROBILINOGEN UR STRIP-ACNC: 0.2 EU/DL (ref 0–1)
WBC #/AREA URNS HPF: NORMAL /HPF
WBC OTHER # BLD: 10.6 K/UL (ref 4.5–11.5)

## 2024-03-11 PROCEDURE — 85025 COMPLETE CBC W/AUTO DIFF WBC: CPT

## 2024-03-11 PROCEDURE — 83735 ASSAY OF MAGNESIUM: CPT

## 2024-03-11 PROCEDURE — 81001 URINALYSIS AUTO W/SCOPE: CPT

## 2024-03-11 PROCEDURE — 2580000003 HC RX 258: Performed by: EMERGENCY MEDICINE

## 2024-03-11 PROCEDURE — 80053 COMPREHEN METABOLIC PANEL: CPT

## 2024-03-11 PROCEDURE — 93970 EXTREMITY STUDY: CPT

## 2024-03-11 PROCEDURE — 99284 EMERGENCY DEPT VISIT MOD MDM: CPT

## 2024-03-11 PROCEDURE — 82550 ASSAY OF CK (CPK): CPT

## 2024-03-11 RX ORDER — 0.9 % SODIUM CHLORIDE 0.9 %
1000 INTRAVENOUS SOLUTION INTRAVENOUS ONCE
Status: COMPLETED | OUTPATIENT
Start: 2024-03-11 | End: 2024-03-11

## 2024-03-11 RX ADMIN — SODIUM CHLORIDE 1000 ML: 9 INJECTION, SOLUTION INTRAVENOUS at 16:10

## 2024-03-11 ASSESSMENT — PAIN - FUNCTIONAL ASSESSMENT: PAIN_FUNCTIONAL_ASSESSMENT: 0-10

## 2024-03-11 ASSESSMENT — PAIN SCALES - GENERAL: PAINLEVEL_OUTOF10: 3

## 2024-03-11 NOTE — ED PROVIDER NOTES
ED PROVIDER NOTE    Chief Complaint   Patient presents with    Leg Pain    Arm Pain       HPI:  3/11/24,   Time: 4:15 PM EDT       Benito Jones is a 28 y.o. male presenting to the ED for body aches.  Ongoing over the last couple of days.  Intermittent sharp pain in bilateral legs and less so in bilateral arms.  Feels pain in the medial aspect of his thighs and calves.  Recent admission 1 month ago for opioid overdose.  Also had rhabdomyolysis during that admission.  No associated fever, chills, cough, chest pain, shortness of breath, abdominal pain, nausea, vomiting, diarrhea.  Normal p.o. intake and urine output.  No longer using drugs since his admission 1 month ago.  Still on cephalexin for lower extremity cellulitis.    Chart review: hx of rhabdomyolysis, sepsis, aspiration pneumonia    Reviewed internal medicine discharge summary from 2/15/2024 by Dr. Colbert at [facility]:  Patient was found unconscious, intubated for airway protection, noted to have multiple abrasions with various stages of healing.  UDS was positive for methamphetamine and cocaine.  Labs also showed CK of 11,000.  Admitted to ICU.  Treated with IV fluids, antibiotics.      Review of Systems:     Review of Systems  Pertinent positives and negatives as stated in HPI     --------------------------------------------- PAST HISTORY ---------------------------------------------  Past Medical History:   Past Medical History:   Diagnosis Date    Vapes nicotine containing substance        Past Surgical History:   History reviewed. No pertinent surgical history.    Social History:   Social History     Socioeconomic History    Marital status: Single     Spouse name: None    Number of children: None    Years of education: None    Highest education level: None   Tobacco Use    Smokeless tobacco: Never   Vaping Use    Vaping Use: Every day   Substance and Sexual Activity    Alcohol use: Not Currently    Drug use: Not Currently     Social